# Patient Record
Sex: FEMALE | Race: WHITE | NOT HISPANIC OR LATINO | URBAN - METROPOLITAN AREA
[De-identification: names, ages, dates, MRNs, and addresses within clinical notes are randomized per-mention and may not be internally consistent; named-entity substitution may affect disease eponyms.]

---

## 2017-02-11 ENCOUNTER — INPATIENT (INPATIENT)
Facility: HOSPITAL | Age: 82
LOS: 6 days | Discharge: HOME | DRG: 260 | End: 2017-02-18
Attending: INTERNAL MEDICINE | Admitting: INTERNAL MEDICINE
Payer: MEDICARE

## 2017-02-11 VITALS
HEART RATE: 80 BPM | OXYGEN SATURATION: 99 % | TEMPERATURE: 98 F | SYSTOLIC BLOOD PRESSURE: 125 MMHG | DIASTOLIC BLOOD PRESSURE: 78 MMHG | RESPIRATION RATE: 18 BRPM

## 2017-02-11 DIAGNOSIS — Z78.9 OTHER SPECIFIED HEALTH STATUS: Chronic | ICD-10-CM

## 2017-02-11 LAB
HCT VFR BLD CALC: 25.9 % — LOW (ref 34.5–45)
HGB BLD-MCNC: 8 G/DL — LOW (ref 11.5–15.5)
MCHC RBC-ENTMCNC: 23.5 PG — LOW (ref 27–34)
MCHC RBC-ENTMCNC: 30.9 G/DL — LOW (ref 32–36)
MCV RBC AUTO: 76.2 FL — LOW (ref 80–100)
PLATELET # BLD AUTO: 211 K/UL — SIGNIFICANT CHANGE UP (ref 150–400)
RBC # BLD: 3.4 M/UL — LOW (ref 3.8–5.2)
RBC # FLD: 15 % — SIGNIFICANT CHANGE UP (ref 10.3–16.9)
WBC # BLD: 6.1 K/UL — SIGNIFICANT CHANGE UP (ref 3.8–10.5)
WBC # FLD AUTO: 6.1 K/UL — SIGNIFICANT CHANGE UP (ref 3.8–10.5)

## 2017-02-11 PROCEDURE — 99220: CPT | Mod: 25

## 2017-02-11 PROCEDURE — 93010 ELECTROCARDIOGRAM REPORT: CPT | Mod: 76

## 2017-02-11 PROCEDURE — 71010: CPT | Mod: 26

## 2017-02-11 RX ORDER — ASPIRIN/CALCIUM CARB/MAGNESIUM 324 MG
325 TABLET ORAL DAILY
Qty: 0 | Refills: 0 | Status: DISCONTINUED | OUTPATIENT
Start: 2017-02-11 | End: 2017-02-12

## 2017-02-11 RX ADMIN — Medication 325 MILLIGRAM(S): at 23:57

## 2017-02-11 NOTE — ED PROVIDER NOTE - OBJECTIVE STATEMENT
84 yo female biba for soa and palpitations while at work tonight (works as a theatre usher for musical). Pt states pmhx of "heart problems", nague historia, Denies syncope, chest pain. Pt states has had intermittent soa for past month. denies recent travel or prolonged immobilization. Pt states she has a pacemaker.

## 2017-02-11 NOTE — ED CDU PROVIDER NOTE - DETAILS
86 yo female hx ventricular pacemaker biba for near syncope while at work tonight. Will place on Observation for serial ekg's/enzymes, continuous cardiac monitoring, cardiology consult.

## 2017-02-11 NOTE — ED ADULT TRIAGE NOTE - CHIEF COMPLAINT QUOTE
palpitations for over a month worsening today. SOB with exertion. denies chest pain palpitations for over a month worsening today. SOB with exertion. denies chest pain. Pt co worker- Johnnie [phone]

## 2017-02-11 NOTE — ED CDU PROVIDER NOTE - PROGRESS NOTE DETAILS
Troponin 0.108 with normal renal fxn. periods of atrial tachycardia which correct quickly. Discussed findings with Dr. Cornejo, will give metoprolol. sleeping, vss, no complaints. awaiting 2nd troponin and ekg/cardiology.

## 2017-02-11 NOTE — ED CDU PROVIDER NOTE - OBJECTIVE STATEMENT
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 Pt states pmhx of "heart problems", nague historia, Denies syncope, chest pain. Pt states has had intermittent soa for past month. denies recent travel or prolonged immobilization. Pt states she has a pacemaker.

## 2017-02-12 DIAGNOSIS — R79.89 OTHER SPECIFIED ABNORMAL FINDINGS OF BLOOD CHEMISTRY: ICD-10-CM

## 2017-02-12 DIAGNOSIS — Z29.9 ENCOUNTER FOR PROPHYLACTIC MEASURES, UNSPECIFIED: ICD-10-CM

## 2017-02-12 DIAGNOSIS — R94.6 ABNORMAL RESULTS OF THYROID FUNCTION STUDIES: ICD-10-CM

## 2017-02-12 DIAGNOSIS — Z95.2 PRESENCE OF PROSTHETIC HEART VALVE: Chronic | ICD-10-CM

## 2017-02-12 DIAGNOSIS — D64.9 ANEMIA, UNSPECIFIED: ICD-10-CM

## 2017-02-12 DIAGNOSIS — R63.8 OTHER SYMPTOMS AND SIGNS CONCERNING FOOD AND FLUID INTAKE: ICD-10-CM

## 2017-02-12 DIAGNOSIS — R60.9 EDEMA, UNSPECIFIED: ICD-10-CM

## 2017-02-12 DIAGNOSIS — R55 SYNCOPE AND COLLAPSE: ICD-10-CM

## 2017-02-12 LAB
ALBUMIN SERPL ELPH-MCNC: 3.5 G/DL — SIGNIFICANT CHANGE UP (ref 3.4–5)
ALP SERPL-CCNC: 68 U/L — SIGNIFICANT CHANGE UP (ref 40–120)
ALT FLD-CCNC: 38 U/L — SIGNIFICANT CHANGE UP (ref 12–42)
AMPHET UR-MCNC: NEGATIVE — SIGNIFICANT CHANGE UP
ANION GAP SERPL CALC-SCNC: 7 MMOL/L — LOW (ref 9–16)
APPEARANCE UR: CLEAR — SIGNIFICANT CHANGE UP
AST SERPL-CCNC: 48 U/L — HIGH (ref 15–37)
BARBITURATES UR SCN-MCNC: NEGATIVE — SIGNIFICANT CHANGE UP
BENZODIAZ UR-MCNC: NEGATIVE — SIGNIFICANT CHANGE UP
BILIRUB SERPL-MCNC: 0.6 MG/DL — SIGNIFICANT CHANGE UP (ref 0.2–1.2)
BILIRUB UR-MCNC: NEGATIVE — SIGNIFICANT CHANGE UP
BUN SERPL-MCNC: 34 MG/DL — HIGH (ref 7–23)
CALCIUM SERPL-MCNC: 9 MG/DL — SIGNIFICANT CHANGE UP (ref 8.5–10.5)
CHLORIDE SERPL-SCNC: 103 MMOL/L — SIGNIFICANT CHANGE UP (ref 96–108)
CK MB CFR SERPL CALC: 3.8 NG/ML — HIGH (ref 0.5–3.6)
CK SERPL-CCNC: 252 U/L — HIGH (ref 26–192)
CK SERPL-CCNC: 452 U/L — HIGH (ref 26–192)
CO2 SERPL-SCNC: 29 MMOL/L — SIGNIFICANT CHANGE UP (ref 22–31)
COCAINE METAB.OTHER UR-MCNC: SIGNIFICANT CHANGE UP NG/ML
COLOR SPEC: YELLOW — SIGNIFICANT CHANGE UP
CREAT SERPL-MCNC: 1.25 MG/DL — SIGNIFICANT CHANGE UP (ref 0.5–1.3)
D DIMER BLD IA.RAPID-MCNC: 259 NG/ML DDU — HIGH
DIFF PNL FLD: NEGATIVE — SIGNIFICANT CHANGE UP
ETHANOL SERPL-MCNC: <3 MG/DL — SIGNIFICANT CHANGE UP
GLUCOSE SERPL-MCNC: 165 MG/DL — HIGH (ref 70–99)
GLUCOSE UR QL: >=1000
INR BLD: 1.23 — HIGH (ref 0.88–1.16)
KETONES UR-MCNC: NEGATIVE — SIGNIFICANT CHANGE UP
LEUKOCYTE ESTERASE UR-ACNC: NEGATIVE — SIGNIFICANT CHANGE UP
LIDOCAIN IGE QN: 287 U/L — SIGNIFICANT CHANGE UP (ref 73–393)
MAGNESIUM SERPL-MCNC: 2.3 MG/DL — SIGNIFICANT CHANGE UP (ref 1.6–2.4)
METHADONE UR-MCNC: NEGATIVE — SIGNIFICANT CHANGE UP
NITRITE UR-MCNC: NEGATIVE — SIGNIFICANT CHANGE UP
NT-PROBNP SERPL-SCNC: 8443 PG/ML — HIGH
OPIATES UR-MCNC: NEGATIVE — SIGNIFICANT CHANGE UP
PCP SPEC-MCNC: SIGNIFICANT CHANGE UP
PCP UR-MCNC: NEGATIVE — SIGNIFICANT CHANGE UP
PH UR: 5 — SIGNIFICANT CHANGE UP (ref 4–8.1)
POTASSIUM SERPL-MCNC: 3.5 MMOL/L — SIGNIFICANT CHANGE UP (ref 3.5–5.3)
POTASSIUM SERPL-SCNC: 3.5 MMOL/L — SIGNIFICANT CHANGE UP (ref 3.5–5.3)
PROT SERPL-MCNC: 6.5 G/DL — SIGNIFICANT CHANGE UP (ref 6.4–8.2)
PROT UR-MCNC: NEGATIVE MG/DL — SIGNIFICANT CHANGE UP
PROTHROM AB SERPL-ACNC: 13.6 SEC — HIGH (ref 10–13.1)
SODIUM SERPL-SCNC: 139 MMOL/L — SIGNIFICANT CHANGE UP (ref 132–145)
SP GR SPEC: 1.02 — SIGNIFICANT CHANGE UP (ref 1–1.03)
THC UR QL: NEGATIVE — SIGNIFICANT CHANGE UP
TROPONIN I SERPL-MCNC: 0.1 NG/ML — HIGH (ref 0.01–0.04)
TROPONIN I SERPL-MCNC: 0.11 NG/ML — HIGH (ref 0.02–0.06)
TROPONIN I SERPL-MCNC: 0.12 NG/ML — HIGH (ref 0.02–0.06)
TSH SERPL-MCNC: 8.44 UIU/ML — HIGH (ref 0.36–3.74)
UROBILINOGEN FLD QL: 0.2 E.U./DL — SIGNIFICANT CHANGE UP

## 2017-02-12 PROCEDURE — 93010 ELECTROCARDIOGRAM REPORT: CPT

## 2017-02-12 PROCEDURE — 99217: CPT

## 2017-02-12 PROCEDURE — 71275 CT ANGIOGRAPHY CHEST: CPT | Mod: 26

## 2017-02-12 RX ORDER — INFLUENZA VIRUS VACCINE 15; 15; 15; 15 UG/.5ML; UG/.5ML; UG/.5ML; UG/.5ML
0.5 SUSPENSION INTRAMUSCULAR ONCE
Qty: 0 | Refills: 0 | Status: DISCONTINUED | OUTPATIENT
Start: 2017-02-12 | End: 2017-02-12

## 2017-02-12 RX ORDER — METOPROLOL TARTRATE 50 MG
25 TABLET ORAL EVERY 8 HOURS
Qty: 0 | Refills: 0 | Status: DISCONTINUED | OUTPATIENT
Start: 2017-02-12 | End: 2017-02-15

## 2017-02-12 RX ORDER — FUROSEMIDE 40 MG
40 TABLET ORAL DAILY
Qty: 0 | Refills: 0 | Status: DISCONTINUED | OUTPATIENT
Start: 2017-02-12 | End: 2017-02-14

## 2017-02-12 RX ORDER — METOPROLOL TARTRATE 50 MG
5 TABLET ORAL ONCE
Qty: 0 | Refills: 0 | Status: COMPLETED | OUTPATIENT
Start: 2017-02-12 | End: 2017-02-12

## 2017-02-12 RX ORDER — ASPIRIN/CALCIUM CARB/MAGNESIUM 324 MG
325 TABLET ORAL ONCE
Qty: 0 | Refills: 0 | Status: COMPLETED | OUTPATIENT
Start: 2017-02-12 | End: 2017-02-12

## 2017-02-12 RX ORDER — ASPIRIN/CALCIUM CARB/MAGNESIUM 324 MG
1 TABLET ORAL
Qty: 0 | Refills: 0 | COMMUNITY

## 2017-02-12 RX ORDER — HEPARIN SODIUM 5000 [USP'U]/ML
5000 INJECTION INTRAVENOUS; SUBCUTANEOUS EVERY 8 HOURS
Qty: 0 | Refills: 0 | Status: DISCONTINUED | OUTPATIENT
Start: 2017-02-12 | End: 2017-02-18

## 2017-02-12 RX ORDER — FUROSEMIDE 40 MG
20 TABLET ORAL ONCE
Qty: 0 | Refills: 0 | Status: COMPLETED | OUTPATIENT
Start: 2017-02-12 | End: 2017-02-12

## 2017-02-12 RX ADMIN — Medication 25 MILLIGRAM(S): at 21:44

## 2017-02-12 RX ADMIN — HEPARIN SODIUM 5000 UNIT(S): 5000 INJECTION INTRAVENOUS; SUBCUTANEOUS at 13:24

## 2017-02-12 RX ADMIN — Medication 20 MILLIGRAM(S): at 11:09

## 2017-02-12 RX ADMIN — Medication 25 MILLIGRAM(S): at 13:23

## 2017-02-12 RX ADMIN — HEPARIN SODIUM 5000 UNIT(S): 5000 INJECTION INTRAVENOUS; SUBCUTANEOUS at 21:44

## 2017-02-12 RX ADMIN — Medication 5 MILLIGRAM(S): at 01:57

## 2017-02-12 NOTE — H&P ADULT. - NEGATIVE ENMT SYMPTOMS
no hearing difficulty/no abnormal taste sensation/no recurrent cold sores/no throat pain/no nasal discharge/no nose bleeds/no nasal obstruction/no post-nasal discharge/no sinus symptoms/no ear pain/no nasal congestion/no dry mouth/no tinnitus/no vertigo/no gum bleeding

## 2017-02-12 NOTE — H&P ADULT. - RS GEN PE MLT RESP DETAILS PC
no chest wall tenderness/no subcutaneous emphysema/no rales/airway obstructed/breath sounds equal/respirations non-labored/no wheezes/good air movement/no intercostal retractions/rhonchi/airway patent

## 2017-02-12 NOTE — H&P ADULT. - ASSESSMENT
Patient is a 85 year old F pmh pacemaker placement, AVR who presents to [facility] complaining of near syncope, palpitations found to likely be in PMT

## 2017-02-12 NOTE — H&P ADULT. - NEGATIVE MUSCULOSKELETAL SYMPTOMS
no arthralgia/no myalgia/no neck pain/no leg pain R/no leg pain L/no muscle weakness/no arm pain L/no arm pain R/no back pain/no stiffness/no muscle cramps/no arthritis/no joint swelling

## 2017-02-12 NOTE — H&P ADULT. - PROBLEM SELECTOR PLAN 1
Likely 2/2 tachycardia  -As per above discussion likely 2/2 Pacemaker mediated tachycardia  -EP consulted, will interrogate device with potential change in programming  -C/w lopressor 25 mg PO TID  -Will check Echo  -C/w telemetry monitoring

## 2017-02-12 NOTE — H&P ADULT. - RADIOLOGY RESULTS AND INTERPRETATION
Lower neck: Visualized thyroid unremarkable. No lower cervical lymphadenopathy.  Vascular: No pulmonary embolism. Main pulmonary artery is mildly enlarged, measuring 3.3 cm in diameter, which can be seen with pulmonary   hypertension. Right and left pulmonary arteries are also mildly dilated.   No aortic aneurysm or dissection. Mild aortic calcifications.    Mediastinum: Cardiomegaly. No pericardial effusion. Aortic valve replacement. Dense mitral annular calcifications. Single atrial lead and dual right ventricular leads.    Trachea/Central airways: Patent.  Lungs/Pleura: Scattered areas of subsegmental atelectasis. No consolidation or mass. 4 mm right lower lobe nodule. Smooth peripheral interlobular septal thickening bilaterally, likely represents   interstitial edema. Trace bilateral pleural effusions. Loculated pleural fluid in the right minor fissure. No pneumothorax.    Lymph nodes: No lymphadenopathy.    IMPRESSION:     No pulmonary embolism.    Mild interstitial edema and trace pleural effusions. Cardiomegaly.

## 2017-02-12 NOTE — H&P ADULT. - PROBLEM SELECTOR PLAN 6
Cardiac diet, replete electrolytes K > 4, Mg > 2  -Given weight loss patient would benefit from reviewing all preventative care cancer screening with outpatient PMD/ obtaining further collateral

## 2017-02-12 NOTE — H&P ADULT. - NEGATIVE NEUROLOGICAL SYMPTOMS
no headache/no hemiparesis/no vertigo/no loss of sensation/no confusion/no syncope/no paresthesias/no difficulty walking/no facial palsy/no generalized seizures/no focal seizures/no tremors/no transient paralysis/no weakness/no loss of consciousness

## 2017-02-12 NOTE — H&P ADULT. - PROBLEM SELECTOR PLAN 4
Microcytic anemia  -Unclear baseline although patient currently HD stable  -Will check iron studies, stool occult

## 2017-02-12 NOTE — H&P ADULT. - NEGATIVE SKIN SYMPTOMS
no pitted nails/no itching/no tumor/no hair loss/no change in size/color of mole/no rash/no brittle nails/no dryness

## 2017-02-12 NOTE — ED ADULT NURSE REASSESSMENT NOTE - NS ED NURSE REASSESS COMMENT FT1
patient verbalizing feeling okay. HR inconsistent, goes up to 130's and down to 80's. Deny CP, no dizziness.
repeat labs resulted.  Breakfast served.  vital signs stable on monitor.  No complaints offered.
Patient denies any acute chest pain, shortness of breath or palpitations, HR 110s-125s. Awaiting cardio, will continue to monitor, remains on continuous cardiac monitoring

## 2017-02-12 NOTE — H&P ADULT. - NEGATIVE GENERAL GENITOURINARY SYMPTOMS
no hematuria/no urine discoloration/no flank pain L/normal urinary frequency/no flank pain R/normal libido/no nocturia/no urinary hesitancy/no renal colic/no bladder infections/no dysuria/no gas in urine/no incontinence

## 2017-02-12 NOTE — H&P ADULT. - LYMPHATIC
anterior cervical L/supraclavicular L/axillary R/posterior cervical R/posterior cervical L/anterior cervical R/supraclavicular R/axillary L

## 2017-02-12 NOTE — H&P ADULT. - NEGATIVE GENERAL SYMPTOMS
no sweating/no polyuria/no chills/no fever/no fatigue/no polyphagia/no weight gain/no polydipsia/no malaise

## 2017-02-12 NOTE — H&P ADULT. - HISTORY OF PRESENT ILLNESS
Patient is a 85 year old F [facility] pacemaker (initially done in 1997, 2nd done [employer] implanted 5/2015 at [facility] with Dr. Laughlin) and aortic valve replacement who was BIBA to [facility] with palpitations, near syncope, SOB, and chest heaviness. 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 The manager then called EMS. Patient reports that these symptoms are not new and that she has experienced them for almost a year with some worsening over the last 2 - 3 months. She reports SOB, palpitations, as well as bilateral lower extremity edema R > L as well as previous syncopal episodes in the past. Her first was during summer 2016 with a second occurring ~ 3 weeks ago at which time she was brought by EMS to [facility]. She reports concomitant 20 - 30 pound weight loss over the last 2 - 3 months as well as anorexia. Denies fevers, chills, cough, abdominal pain, N/V/D, melena, hematochezia, urinary signs or symptoms. ROS is otherwise negative.     [facility] cardi[facility] note patient was "found to be in alternating sinus rhythm with ventricular pacing (A-sensed, V-paced), and intermittently tachycardic with what appeared to be either an atrial tachycardia with ventricular pacing vs. pacemaker mediated tachycardia (PMT).  The pt had multiple episodes of this tachycardia (HR of 125-130 bpm) in the ER lasting anywhere from 10-12 beats up to >30 seconds.  Episodes would self terminate and NSR would resume with A-sensed, V-paced rhythm in the 70-80 bpm range.  Pt felt palpitations, chest discomfort and mild SOB with tachycardic episodes.  Pt received dose of metoprolol overnight with little improvement.  A magnet was applied and revealed a PPM magnet rate of 100 bpm.  When applied during tachycardia episodes, the HR would decrease to 100 bpm, and when magnet removed, HR would generally revert to NSR with A-sensed, V-paced rhythm in the 70-80 bpm range suggesting PMT."    In the ED her vitals were as follows:  98 - 98.4, 75 - 125, 123/68 - 149/94, 17, 99% RA    She was given:  Lasix 20 mg IV x 1, Metoprolol 5 mg IV x 1,  mg PO qd x 1

## 2017-02-12 NOTE — H&P ADULT. - GASTROINTESTINAL DETAILS
no bruit/soft/bowel sounds normal/no rebound tenderness/no masses palpable/nontender/no guarding/no distention/no organomegaly/normal/no rigidity

## 2017-02-12 NOTE — H&P ADULT. - PROBLEM SELECTOR PLAN 2
Likely 2/2 demand ischemia, troponin @ .121 <- .108  -Pt currently asymptomatic, unlikely ACS  -no acute ischemic changes on EKG  -Will continue to trend to peak

## 2017-02-12 NOTE — H&P ADULT. - NEGATIVE GASTROINTESTINAL SYMPTOMS
no nausea/no abdominal pain/no vomiting/no hematochezia/no hiccoughs/no change in bowel habits/no constipation/no melena/no diarrhea/no steatorrhea/no jaundice/no flatulence

## 2017-02-12 NOTE — H&P ADULT. - NEGATIVE PSYCHIATRIC SYMPTOMS
no agitation/no suicidal ideation/no hyperactivity/no depression/no mood swings/no visual hallucinations/no anxiety/no insomnia/no paranoia/no auditory hallucinations

## 2017-02-12 NOTE — CONSULT NOTE ADULT - SUBJECTIVE AND OBJECTIVE BOX
[facility] ([facility]) ER CARDIOLOGY CONSULT NOTE    Patient is a 85y old  Female who presents with a chief complaint of palpitations    HPI:85y Female with PMHX of pacemaker and aortic valve replacement presents with palpitations, near syncope, SOB, and CP.  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  Pt has been having symptoms for months, but last saw cardiologist about "a year ago" she thinks.  Pt notes she had syncopal episode at work about 3 weeks ago and was taken by ambulance to [facility].  Notes another syncopal episode at work "this summer or fall" where she believes she also went to [place] [facility].  Last night pt was BIBEMS to [facility] ER and was found to be in alternating sinus rhythm with ventricular pacing (A-sensed, V-paced), and intermittently tachycardic with what appeared to be either an atrial tachycardia with ventricular pacing vs. pacemaker mediated tachycardia (PMT).  The pt had multiple episodes of this tachycardia (HR of 125-130 bpm) in the ER lasting anywhere from 10-12 beats up to >30 seconds.  Episodes would self terminate and NSR would resume with A-sensed, V-paced rhythm in the 70-80 bpm range.  Pt felt palpitations, chest discomfort and mild SOB with tachycardic episodes.  Pt received dose of metoprolol overnight with little improvement.  A magnet was applied and revealed a PPM magnet rate of 100 bpm.  When applied during tachycardia episodes, the HR would decrease to 100 bpm, and when magnet removed, HR would generally revert to NSR with A-sensed, V-paced rhythm in the 70-80 bpm range suggesting PMT.    Labs overnight revealed slightly uptrending troponin 0.10>0.12, elevated BNP >8000, and CT PE protocol showed interstitial edema, and small b/l pleural effusions.    REVIEW OF SYSTEMS: Denies syncope, and PND  +dizziness, +palpitations, +chest pain, +SOB, +change in exercise tolerance, +orthopnea,claudication, +edema    PAST MEDICAL & SURGICAL HISTORY:  PPM ([employer] implanted 2015 at [facility] with Dr. 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 [employer]    FAMILY HISTORY:  father  TB age 36  mother  pneumonia age 39  daughter  brain tumor    Vital Signs Last 24 Hrs  T(C): 36.9, Max: 37.2 ( @ 06:17)  T(F): 98.4, Max: 98.9 ( @ 06:17)  HR: 75 (73 - 125)  BP: 123/68 (123/68 - 149/94)  BP(mean): --  RR: 21 (16 - 22)  SpO2: 100% (99% - 100%)  PHYSICAL EXAM:  General: mild distress during tachycardia episodes  HEENT: NCAT, MMM, no icterus  Neck: +JVD to angle of jaw, no carotid bruits  Cardiovascular: S1/S2 nl, irregular, no 3/6 holosystolic murmur radiating to axilla, sternotomy scar  Lungs: decreased BS b/l  Abdominal: +BS, soft, slight diffuse TTP/ND, no RGR  Extremities: warm, dry, pulses 2+ x 4 ext,trace b/l lower ext edema R>L  Skin: no rash, bruising, or bleeding  Neuro: AAOx3, no FND    ECG: NSR atrial sensed ventricular paced, tachycardia (unknown rhythm) with ventricular pacing  TELEMETRY: reviewed  BEDSIDE ECHO FINDINGS:  EF 40-45%, mild global hypokinesis, bioprosthetic AVR, moderate/severe MR, moderate TR, moderate PI, no pericardial effusion, dilated IVC    LABS:                        8.0    6.1   )-----------( 211      ( 2017 23:45 )             25.9     2017 23:45    139    |  103    |  34     ----------------------------<  165    3.5     |  29     |  1.25     Ca    9.0        2017 23:45  Mg     2.3       2017 23:45    TPro  6.5    /  Alb  3.5    /  TBili  0.6    /  DBili  x      /  AST  48     /  ALT  38     /  AlkPhos  68     2017 23:45    CARDIAC MARKERS ( 2017 06:02 )  0.121 ng/mL / x     / x     / x     / x      CARDIAC MARKERS ( 2017 23:45 )  0.108 ng/mL / x     / 452 U/L / x     / x        PT/INR - ( 2017 23:45 )   PT: 13.6 sec;   INR: 1.23       Urinalysis Basic - ( 2017 00:32 )    Color: Yellow / Appearance: Clear / S.025 / pH: x  Gluc: x / Ketone: NEGATIVE  / Bili: NEGATIVE / Urobili: 0.2 E.U./dL   Blood: x / Protein: 30 mg/dL / Nitrite: NEGATIVE   Leuk Esterase: NEGATIVE / RBC: x / WBC x   Sq Epi: x / Non Sq Epi: x / Bacteria: x    RADIOLOGY & ADDITIONAL STUDIES: CT PE protocol: IMPRESSION: No pulmonary embolism. Mild interstitial edema and trace pleural effusions. Cardiomegaly.    PRIOR CARDIAC TESTING: none available    ASSESSMENT:  85y Female PMHX of pacemaker and aortic valve replacement presents with palpitations, near syncope, SOB, and CP.      PLAN:  1.  Tachycardia - likely PMT.  Utilize magnet temporarily to stop episodes.  EP evaluation and reprogramming of device (likely increase PVARP).  2.  Chest pain - possible demand ischemia.  Continue aspirin.  Trend troponin.  Improve heart rate as above.  3.  Abnormal troponin - see above, doubt ACS.  4.  Elevated BNP - multifactorial.  Lasix 20 mg IV x 1 given in ER.  Pt has reduced EF, and valve pathology.  Continue diuresis as tolerated.  5.  Mitral regurgitation - unknown chronicity.  Diuresis.  Optimize LV function with medication/heart rate control.  6.  Cardiomyopathy - unknown etiology/chronicity.  Obtain outpatient records.  Ischemia vs. tachycardia induced vs. valvular vs. metabolic (elevated TSH) vs. multifactorial.  Recommend formal echo, optimized HF medications, and consider ischemia eval.  7.  Microcytic anemia - check iron studies.  Obtain prior Hgb levels from outside records.  Pt described one episode of "blood running down leg" possibly from hemorrhoidal source in last few weeks.  8.  Elevated TSH - check free T3/T4.  9.  Discussed with Dr. Bialey, Dr. Mccray, and Dr. Richey.  Pt transferred to [facility] cardiac telemetry for further management and observation.

## 2017-02-13 DIAGNOSIS — I50.21 ACUTE SYSTOLIC (CONGESTIVE) HEART FAILURE: ICD-10-CM

## 2017-02-13 DIAGNOSIS — I25.10 ATHEROSCLEROTIC HEART DISEASE OF NATIVE CORONARY ARTERY WITHOUT ANGINA PECTORIS: ICD-10-CM

## 2017-02-13 LAB
ANION GAP SERPL CALC-SCNC: 6 MMOL/L — LOW (ref 9–16)
BUN SERPL-MCNC: 25 MG/DL — HIGH (ref 7–23)
CALCIUM SERPL-MCNC: 8.3 MG/DL — LOW (ref 8.5–10.5)
CHLORIDE SERPL-SCNC: 106 MMOL/L — SIGNIFICANT CHANGE UP (ref 96–108)
CHOLEST SERPL-MCNC: 104 MG/DL — SIGNIFICANT CHANGE UP
CO2 SERPL-SCNC: 28 MMOL/L — SIGNIFICANT CHANGE UP (ref 22–31)
CREAT SERPL-MCNC: 1.11 MG/DL — SIGNIFICANT CHANGE UP (ref 0.5–1.3)
GLUCOSE SERPL-MCNC: 113 MG/DL — HIGH (ref 70–99)
HBA1C BLD-MCNC: 6.9 % — HIGH (ref 4.8–5.6)
HCT VFR BLD CALC: 26.8 % — LOW (ref 34.5–45)
HDLC SERPL-MCNC: 39 MG/DL — LOW
HGB BLD-MCNC: 8 G/DL — LOW (ref 11.5–15.5)
IRON SATN MFR SERPL: 14 UG/DL — LOW (ref 50–170)
IRON SATN MFR SERPL: 4 % — LOW (ref 20–38)
LIPID PNL WITH DIRECT LDL SERPL: 51 MG/DL — SIGNIFICANT CHANGE UP
MAGNESIUM SERPL-MCNC: 2.3 MG/DL — SIGNIFICANT CHANGE UP (ref 1.6–2.4)
MCHC RBC-ENTMCNC: 22.9 PG — LOW (ref 27–34)
MCHC RBC-ENTMCNC: 29.9 G/DL — LOW (ref 32–36)
MCV RBC AUTO: 76.6 FL — LOW (ref 80–100)
OB PNL STL: NEGATIVE — SIGNIFICANT CHANGE UP
PHOSPHATE SERPL-MCNC: 3 MG/DL — SIGNIFICANT CHANGE UP (ref 2.5–4.5)
PLATELET # BLD AUTO: 231 K/UL — SIGNIFICANT CHANGE UP (ref 150–400)
POTASSIUM SERPL-MCNC: 3.7 MMOL/L — SIGNIFICANT CHANGE UP (ref 3.5–5.3)
POTASSIUM SERPL-SCNC: 3.7 MMOL/L — SIGNIFICANT CHANGE UP (ref 3.5–5.3)
RBC # BLD: 3.5 M/UL — LOW (ref 3.8–5.2)
RBC # FLD: 15.4 % — SIGNIFICANT CHANGE UP (ref 10.3–16.9)
SODIUM SERPL-SCNC: 140 MMOL/L — SIGNIFICANT CHANGE UP (ref 135–145)
T3FREE SERPL-MCNC: 1.75 PG/ML — SIGNIFICANT CHANGE UP (ref 1.71–3.71)
T4 AB SER-ACNC: 5.07 UG/DL — SIGNIFICANT CHANGE UP (ref 3.17–11.72)
TIBC SERPL-MCNC: 332 UG/DL — SIGNIFICANT CHANGE UP (ref 250–450)
TOTAL CHOLESTEROL/HDL RATIO MEASUREMENT: 2.7 RATIO — SIGNIFICANT CHANGE UP
TRIGL SERPL-MCNC: 69 MG/DL — SIGNIFICANT CHANGE UP
TSH SERPL-MCNC: 5 UIU/ML — HIGH (ref 0.35–4.94)
WBC # BLD: 7.6 K/UL — SIGNIFICANT CHANGE UP (ref 3.8–10.5)
WBC # FLD AUTO: 7.6 K/UL — SIGNIFICANT CHANGE UP (ref 3.8–10.5)

## 2017-02-13 PROCEDURE — 93970 EXTREMITY STUDY: CPT | Mod: 26

## 2017-02-13 PROCEDURE — 99223 1ST HOSP IP/OBS HIGH 75: CPT | Mod: 25

## 2017-02-13 PROCEDURE — 93010 ELECTROCARDIOGRAM REPORT: CPT

## 2017-02-13 PROCEDURE — 99223 1ST HOSP IP/OBS HIGH 75: CPT

## 2017-02-13 PROCEDURE — 93280 PM DEVICE PROGR EVAL DUAL: CPT | Mod: 26

## 2017-02-13 PROCEDURE — 93306 TTE W/DOPPLER COMPLETE: CPT | Mod: 26

## 2017-02-13 RX ORDER — ASPIRIN/CALCIUM CARB/MAGNESIUM 324 MG
81 TABLET ORAL DAILY
Qty: 0 | Refills: 0 | Status: DISCONTINUED | OUTPATIENT
Start: 2017-02-13 | End: 2017-02-18

## 2017-02-13 RX ORDER — FUROSEMIDE 40 MG
20 TABLET ORAL ONCE
Qty: 0 | Refills: 0 | Status: COMPLETED | OUTPATIENT
Start: 2017-02-13 | End: 2017-02-13

## 2017-02-13 RX ORDER — POTASSIUM CHLORIDE 20 MEQ
40 PACKET (EA) ORAL ONCE
Qty: 0 | Refills: 0 | Status: COMPLETED | OUTPATIENT
Start: 2017-02-13 | End: 2017-02-13

## 2017-02-13 RX ORDER — FERROUS SULFATE 325(65) MG
325 TABLET ORAL EVERY 12 HOURS
Qty: 0 | Refills: 0 | Status: DISCONTINUED | OUTPATIENT
Start: 2017-02-13 | End: 2017-02-18

## 2017-02-13 RX ORDER — LISINOPRIL 2.5 MG/1
2.5 TABLET ORAL DAILY
Qty: 0 | Refills: 0 | Status: DISCONTINUED | OUTPATIENT
Start: 2017-02-13 | End: 2017-02-16

## 2017-02-13 RX ADMIN — HEPARIN SODIUM 5000 UNIT(S): 5000 INJECTION INTRAVENOUS; SUBCUTANEOUS at 05:29

## 2017-02-13 RX ADMIN — LISINOPRIL 2.5 MILLIGRAM(S): 2.5 TABLET ORAL at 11:43

## 2017-02-13 RX ADMIN — HEPARIN SODIUM 5000 UNIT(S): 5000 INJECTION INTRAVENOUS; SUBCUTANEOUS at 22:19

## 2017-02-13 RX ADMIN — Medication 25 MILLIGRAM(S): at 22:19

## 2017-02-13 RX ADMIN — Medication 81 MILLIGRAM(S): at 16:21

## 2017-02-13 RX ADMIN — Medication 25 MILLIGRAM(S): at 13:18

## 2017-02-13 RX ADMIN — Medication 40 MILLIGRAM(S): at 05:29

## 2017-02-13 RX ADMIN — Medication 40 MILLIEQUIVALENT(S): at 11:09

## 2017-02-13 RX ADMIN — Medication 25 MILLIGRAM(S): at 05:29

## 2017-02-13 RX ADMIN — Medication 20 MILLIGRAM(S): at 18:00

## 2017-02-13 RX ADMIN — Medication 325 MILLIGRAM(S): at 16:21

## 2017-02-13 RX ADMIN — HEPARIN SODIUM 5000 UNIT(S): 5000 INJECTION INTRAVENOUS; SUBCUTANEOUS at 14:00

## 2017-02-13 NOTE — CONSULT NOTE ADULT - ATTENDING COMMENTS
Increase bblockers, if persistent Atach consider ablation. Also might need to upgrade to biV pacer guven cardiomyopathy. Continue diuresis.

## 2017-02-13 NOTE — PROGRESS NOTE ADULT - SUBJECTIVE AND OBJECTIVE BOX
CARDIOLOGY NP PROGRESS NOTE    Subjective: Pt seen and examined at bedside. Denies chest pain, dizziness, palpitations. reports SOB when she ambulated to the bathroom this morning. .    Overnight Events: No events.    TELEMETRY: V-paced on tele.  EKG:      VITAL SIGNS:  T(C): 37.2, Max: 37.2 (02-13 @ 14:04)  HR: 70 (70 - 70)  BP: 105/62 (102/54 - 117/57)  RR: 14 (14 - 120)  SpO2: 100% (97% - 100%)  Wt(kg): --    I&O's Summary  I & Os for 24h ending 13 Feb 2017 07:00  =============================================  IN: 700 ml / OUT: 1800 ml / NET: -1100 ml    I & Os for current day (as of 13 Feb 2017 14:54)  =============================================  IN: 0 ml / OUT: 300 ml / NET: -300 ml        PHYSICAL EXAM:    General: A/ox 3, No acute Distress  Neck: Supple, NO JVD  Cardiac: S1 S2, No M/R/G, No JVD  Pulmonary: CTAB, Breating unlabored, No Rhonchi/Rales/Wheezing  Abdomen: Soft, Non -tender, +BS x 4 quads  Extremities: No Rashes, No edema  Neuro: A/o x 3, No focal deficits    Lines:       LABS:                          8.0    7.6   )-----------( 231      ( 13 Feb 2017 07:44 )             26.8                              13 Feb 2017 07:44    140    |  106    |  25     ----------------------------<  113    3.7     |  28     |  1.11     Ca    8.3        13 Feb 2017 07:44  Phos  3.0       13 Feb 2017 07:44  Mg     2.3       13 Feb 2017 07:44    TPro  6.5    /  Alb  3.5    /  TBili  0.6    /  DBili  x      /  AST  48     /  ALT  38     /  AlkPhos  68     11 Feb 2017 23:45    LIVER FUNCTIONS - ( 11 Feb 2017 23:45 )  Alb: 3.5 g/dL / Pro: 6.5 g/dL / ALK PHOS: 68 U/L / ALT: 38 U/L / AST: 48 U/L / GGT: x                                 PT/INR - ( 11 Feb 2017 23:45 )   PT: 13.6 sec;   INR: 1.23            CAPILLARY BLOOD GLUCOSE    CARDIAC MARKERS ( 12 Feb 2017 16:11 )  0.100 ng/mL / x     / 252 U/L / x     / 3.8 ng/mL  CARDIAC MARKERS ( 12 Feb 2017 06:02 )  0.121 ng/mL / x     / x     / x     / x      CARDIAC MARKERS ( 11 Feb 2017 23:45 )  0.108 ng/mL / x     / 452 U/L / x     / x                No Known Allergies    MEDICATIONS  (STANDING):  heparin  Injectable 5000Unit(s) SubCutaneous every 8 hours  metoprolol 25milliGRAM(s) Oral every 8 hours  furosemide    Tablet 40milliGRAM(s) Oral daily  lisinopril 2.5milliGRAM(s) Oral daily    MEDICATIONS  (PRN):        DIAGNOSTIC TESTS:

## 2017-02-13 NOTE — PROGRESS NOTE ADULT - PROBLEM SELECTOR PLAN 2
s/p CABG 1997 (LIMA to LAD) and non obstructive CAD by cath in 2014.  - Elevated trops likely demand in the setting of mild cHF and tachycardia and now downtrending.  - Continue Metoprolol. Will restart Aspirin and Statin.

## 2017-02-13 NOTE — PROGRESS NOTE ADULT - PROBLEM SELECTOR PLAN 1
Likely 2/2 tachycardia  -As per above discussion likely 2/2 Pacemaker mediated tachycardia  -EP following; f/u recs.   -Continue Lopressor 25 mg every 8 hours.   -Will check Echo  -C/w telemetry monitoring Etiology likely 2/2 tachycardia. Of note, pt has a recent admission to [facility] in Dec/2016 for "dizzy spells" and was discharged on Amiodarone presumably for an atrial arrhythmia.   -  Per EP, pt likely has A-tach with PPM tracking the atrial rate.    - Continue Lopressor 25 mg every 8 hours. f/u EP recs.

## 2017-02-13 NOTE — PROGRESS NOTE ADULT - PROBLEM SELECTOR PLAN 4
Pt with chronic microcytic anemia likely 2/2 malignancy. Per PMD, baseline hgb around 8 since 2015.  - Started on Iron supplements. Stool occult negative.  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  Patient was support to return for followup which she never did.    - Will schedule patient for ongoing outpatient workup. Pt with chronic microcytic anemia likely 2/2 malignancy. Per PMD, baseline hgb around 8 since 2015.  - Started on Iron supplements. Stool occult negative.  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  Patient was supposed to return for followup which she never did.    - Will schedule patient for ongoing outpatient workup.

## 2017-02-13 NOTE — PROGRESS NOTE ADULT - ASSESSMENT
Patient is a 85 year old female with pmhx of CAD s/p CABG 1997 (LIMA to LAD), severe AS s/p TAVR (2014), PPM, Diastolic Heart Failure (EF 57% in 9/2016) who presents to [facility] complaining of near syncope, palpitations found to be likely 2/2 PMT s/p PPM interrogation and repogramming with improvement.

## 2017-02-13 NOTE — CONSULT NOTE ADULT - SUBJECTIVE AND OBJECTIVE BOX
HISTORY OF PRESENT ILLNESS:     84 y/o F with history of AVR, dual chamber PPM ([employer]) for CHB with latest generator change in  at [facility] by Dr. Laughlin, non-obstructive CAD by cath , and TAVR in , who has 3 admissions to the hospital within the last 3 months including this time for SOB / CHF exacerbation.  Old records showed LVEF of 60% in 2016.  During her last admission to [facility] in Dec 2016, she was supposed to be on Amiodarone and lasix upon discharge there.  However, her daughter and daughter-in-law  last year and she admits to not being compliant with meds and medical follow up.  She presented again on 17 with worsening SOB and LE edema.   She was noted to be in and out of NSR and atrial tachy with ventricular tracking at upper tracking rate of 130 bpm.        pmh pacemaker (initially done in , 2nd done [employer] implanted 2015 at [facility] with Dr. Laughlin) and aortic valve replacement who was BIBA to [facility] with palpitations, near syncope, SOB, and chest heaviness. 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 The manager then called EMS. Patient reports that these symptoms are not new and that she has experienced them for almost a year with some worsening over the last 2 - 3 months. She reports SOB, palpitations, as well as bilateral lower extremity edema R > L as well as previous syncopal episodes in the past. Her first was during summer 2016 with a second occurring ~ 3 weeks ago at which time she was brought by EMS to [facility]. She reports concomitant 20 - 30 pound weight loss over the last 2 - 3 months as well as anorexia. Denies fevers, chills, cough, abdominal pain, N/V/D, melena, hematochezia, urinary signs or symptoms. ROS is otherwise negative.     [facility] cardiology note patient was "found to be in alternating sinus rhythm with ventricular pacing (A-sensed, V-paced), and intermittently tachycardic with what appeared to be either an atrial tachycardia with ventricular pacing vs. pacemaker mediated tachycardia (PMT).  The pt had multiple episodes of this tachycardia (HR of 125-130 bpm) in the ER lasting anywhere from 10-12 beats up to >30 seconds.  Episodes would self terminate and NSR would resume with A-sensed, V-paced rhythm in the 70-80 bpm range.  Pt felt palpitations, chest discomfort and mild SOB with tachycardic episodes.  Pt received dose of metoprolol overnight with little improvement.  A magnet was applied and revealed a PPM magnet rate of 100 bpm.  When applied during tachycardia episodes, the HR would decrease to 100 bpm, and when magnet removed, HR would generally revert to NSR with A-sensed, V-paced rhythm in the 70-80 bpm range suggesting PMT."    In the ED her vitals were as follows:  98 - 98.4, 75 - 125, 123/68 - 149/94, 17, 99% RA    She was given:  Lasix 20 mg IV x 1, Metoprolol 5 mg IV x 1,  mg PO qd x 1 (2017 13:37)          PAST MEDICAL AND SURGICAL HISTORY:  PAST MEDICAL & SURGICAL HISTORY:  Pacemaker  Medical history unknown  Aortic valve replaced  Medical history unknown      FAMILY HISTORY: FAMILY HISTORY:  No pertinent family history in first degree relatives  Family history unknown      SOCIAL HISTORY: Occupation:  [employer]: Denied  Smoking: Denied  Drug Use: Denied  Marital Status:         REVIEW OF SYSTEM: REVIEW OF SYSTEMS:    CONSTITUTIONAL: No fever, weight loss, or fatigue  EYES: No eye pain, visual disturbances, or discharge  ENMT:  No difficulty hearing, tinnitus, vertigo; No sinus or throat pain  NECK: No pain or stiffness  BREASTS: No pain, masses, or nipple discharge  RESPIRATORY: No cough, wheezing, chills or hemoptysis; No shortness of breath  CARDIOVASCULAR: No chest pain, palpitations, dizziness, or leg swelling  GASTROINTESTINAL: No abdominal or epigastric pain. No nausea, vomiting, or hematemesis; No diarrhea or constipation. No melena or hematochezia.  GENITOURINARY: No dysuria, frequency, hematuria, or incontinence  NEUROLOGICAL: No headaches, memory loss, loss of strength, numbness, or tremors  SKIN: No itching, burning, rashes, or lesions   LYMPH NODES: No enlarged glands  ENDOCRINE: No heat or cold intolerance; No hair loss  MUSCULOSKELETAL: No joint pain or swelling; No muscle, back, or extremity pain  PSYCHIATRIC: No depression, anxiety, mood swings, or difficulty sleeping  HEME/LYMPH: No easy bruising, or bleeding gums  ALLERGY AND IMMUNOLOGIC: No hives or eczema      ALLERGY:  No Known Allergies      INPATIENT MEDICATIONS:  heparin  Injectable 5000Unit(s) SubCutaneous every 8 hours  metoprolol 25milliGRAM(s) Oral every 8 hours  furosemide    Tablet 40milliGRAM(s) Oral daily  lisinopril 2.5milliGRAM(s) Oral daily  aspirin enteric coated 81milliGRAM(s) Oral daily  ferrous    sulfate 325milliGRAM(s) Oral every 12 hours      VITAL SIGNS:   T(C): 37.2, Max: 37.2 ( @ 14:04)  HR: 70 (70 - 70)  BP: 105/62 (102/54 - 117/57)  RR: 14 (14 - 120)  SpO2: 100% (97% - 100%)  Wt(kg): --    PHYSICAL EXAM:   Appearance:   CVS:   Pulm:   Abd:  Soft, NT/ND, + BS	  Ext: No edema    LABS:                        8.0    7.6   )-----------( 231      ( 2017 07:44 )             26.8     2017 07:44    140    |  106    |  25     ----------------------------<  113    3.7     |  28     |  1.11     Ca    8.3        2017 07:44  Phos  3.0       2017 07:44  Mg     2.3       2017 07:44    TPro  6.5    /  Alb  3.5    /  TBili  0.6    /  DBili  x      /  AST  48     /  ALT  38     /  AlkPhos  68     2017 23:45    PT/INR - ( 2017 23:45 )   PT: 13.6 sec;   INR: 1.23            TSH  TroponinTroponin I, Serum: 0.100 ng/mL ( @ 16:11)  Troponin I, Serum: 0.121 ng/mL ( @ 06:02)  Troponin I, Serum: 0.108 ng/mL ( @ 23:45)     LIVER FUNCTIONS - ( 2017 23:45 )  Alb: 3.5 g/dL / Pro: 6.5 g/dL / ALK PHOS: 68 U/L / ALT: 38 U/L / AST: 48 U/L / GGT: x             EKG:    Telemetry:    ECHO: HISTORY OF PRESENT ILLNESS:     86 y/o F with history of AVR, dual chamber PPM ([employer]) for CHB with latest generator change in  at [facility] by Dr. Laughlin, non-obstructive CAD by cath , TAVR in , and a recently diagnosed pancreatic mass, who has 3 admissions to the hospital within the last 3 months including this time for SOB / CHF exacerbation.  Old records showed LVEF of 60% in 2016.  During her last admission to [facility] in Dec 2016, she was supposed to be on Amiodarone and lasix upon discharge there.  However, her daughter and daughter-in-law  last year and she admits to not being compliant with meds and medical follow up.  She presented again on 17 with worsening SOB, palpitations and LE edema.   She was noted to be in and out of NSR and atrial tachy with ventricular tracking at upper tracking rate of 130 bpm.  Device was checked yesterday and reprogrammed to VVI at 70 bpm (non-tracking mode).  She feels better today w/o palpitations / chest pressure.  She is getting diuresing.      PAST MEDICAL AND SURGICAL HISTORY: as above.     FAMILY HISTORY: FAMILY HISTORY:  No pertinent family history in first degree relatives    SOCIAL HISTORY: Occupation: [employer]: Denied  Smoking: Denied  Drug Use: Denied  Lives with her son.  [place]     REVIEW OF SYSTEM:   CONSTITUTIONAL: No fever, weight loss, or fatigue  EYES: No eye pain, visual disturbances, or discharge  ENMT:  No difficulty hearing, tinnitus, vertigo; No sinus or throat pain  NECK: No pain or stiffness  BREASTS: No pain, masses, or nipple discharge  RESPIRATORY: still shortness of breath (but better). No cough / no hemoptysis  CARDIOVASCULAR: SEE HPI  GASTROINTESTINAL: Denies n/v/d. Denies dark stool.   GENITOURINARY: No dysuria, frequency, hematuria, or incontinence  NEUROLOGICAL: No headaches, memory loss, loss of strength, numbness, or tremors  SKIN: No itching, burning, rashes, or lesions   LYMPH NODES: No enlarged glands  ENDOCRINE: No heat or cold intolerance; No hair loss  MUSCULOSKELETAL: No joint pain or swelling; No muscle, back, or extremity pain  PSYCHIATRIC: "somewhat depressed because of the deaths in the family" but denies suicidal ideation.   HEME/LYMPH: No easy bruising, or bleeding gums  ALLERGY AND IMMUNOLOGIC: No hives or eczema      ALLERGY:  No Known Allergies    INPATIENT MEDICATIONS: (started at [facility])   heparin  Injectable 5000Unit(s) SubCutaneous every 8 hours  metoprolol 25milliGRAM(s) Oral every 8 hours  furosemide    Tablet 40milliGRAM(s) Oral daily  lisinopril 2.5milliGRAM(s) Oral daily  aspirin enteric coated 81milliGRAM(s) Oral daily  ferrous    sulfate 325milliGRAM(s) Oral every 12 hours      VITAL SIGNS:   T(C): 37.2, Max: 37.2 ( @ 14:04)  HR: 70 (70 - 70)  BP: 105/62 (102/54 - 117/57)  RR: 14 (14 - 120)  SpO2: 100% (97% - 100%)    PHYSICAL EXAM:   Appearance: NAD  Neck: prominent JVD up to earlobe.   CVS: S1/S2+, regular / paced, no murmur. Old sternal scar.  Left subclavicular PPM site, no skin erosion.   Pulm: Decreased BS bilaterally. Slight rale at bases. No wheezes  Abd:  Soft, NT/ND, + BS	  Ext: +2 pitting edema LE   Neuro: AAO x 3.     LABS:                        8.0    7.6   )-----------( 231      ( 2017 07:44 )             26.8     140    |  106    |  25     ----------------------------<  113    3.7     |  28     |  1.11     Ca    8.3        2017 07:44  Phos  3.0       2017 07:44  Mg     2.3       2017 07:44    TPro  6.5    /  Alb  3.5    /  TBili  0.6    /  DBili  x      /  AST  48     /  ALT  38     /  AlkPhos  68     2017 23:45    PT/INR - ( 2017 23:45 )   PT: 13.6 sec;   INR: 1.23       TSH: 5     TroponinTroponin I, Serum: 0.100 ng/mL ( @ 16:11)  Troponin I, Serum: 0.121 ng/mL ( @ 06:02)  Troponin I, Serum: 0.108 ng/mL ( @ 23:45)     LIVER FUNCTIONS - ( 2017 23:45 )  Alb: 3.5 g/dL / Pro: 6.5 g/dL / ALK PHOS: 68 U/L / ALT: 38 U/L / AST: 48 U/L / GGT: x             EKG:      Telemetry:   at 70 bpm.     ECHO: 17:  severe concentric left ventricular hypertrophy. There is moderate   global hypokinesis of the left ventricle.  The left ventricular ejection   fraction is moderately reduced. The left ventricular ejection fraction is   estimated to be 35-40% The left atrium is severely dilated. Right   atrial size   is normal.The right ventricle is normal in size and function. There is a   pacemaker wire in the right heart.  There is a bioprosthetic aortic   valve. No   aortic regurgitation noted.The peak pressure gradient is 20 mmHg, mean   pressure gradient is 10 mmHg.Mitral annular calcification noted. There is   moderate mitral regurgitation.There is moderate tricuspid regurgitation.   There   is trace pulmonic regurgitation.  There is severe pulmonary   hypertension. The   pulmonary artery systolic pressure is estimated to be 60 mmHg.  The IVC   is   dilated (>2.1 cm) with an abnormal inspiratory collapse (<50%)   consistent with   elevated right atrial pressure.  No aortic root dilatation.There is no   pericardial effusion. HISTORY OF PRESENT ILLNESS:     86 y/o F with history of AVR, dual chamber PPM ([employer]) for CHB with latest generator change in  at [facility] by Dr. Laughlin, non-obstructive CAD by cath , TAVR in , and a recently diagnosed pancreatic mass, who has 3 admissions to the hospital within the last 3 months including this time for SOB / CHF exacerbation.  Old records showed LVEF of 60% in 2016.  During her last admission to [facility] in Dec 2016, she was supposed to be on Amiodarone and lasix upon discharge there.  However, her daughter and daughter-in-law  last year and she admits to not being compliant with meds and medical follow up.  She presented again on 17 with worsening SOB, palpitations and LE edema.   She was noted to be in and out of NSR and atrial tachy with ventricular tracking at upper tracking rate of 130 bpm.  Device was checked yesterday and reprogrammed to VVI at 70 bpm (non-tracking mode).  She feels better today w/o palpitations / chest pressure.  She is getting diuresing.      PAST MEDICAL AND SURGICAL HISTORY: as above.     FAMILY HISTORY: FAMILY HISTORY:  No pertinent family history in first degree relatives    SOCIAL HISTORY: Occupation: [employer]: Denied  Smoking: Denied  Drug Use: Denied  Lives with her son.  [place]     REVIEW OF SYSTEM:   CONSTITUTIONAL: No fever, weight loss, or fatigue  EYES: No eye pain, visual disturbances, or discharge  ENMT:  No difficulty hearing, tinnitus, vertigo; No sinus or throat pain  NECK: No pain or stiffness  BREASTS: No pain, masses, or nipple discharge  RESPIRATORY: still shortness of breath (but better). No cough / no hemoptysis  CARDIOVASCULAR: SEE HPI  GASTROINTESTINAL: Denies n/v/d. Denies dark stool.   GENITOURINARY: No dysuria, frequency, hematuria, or incontinence  NEUROLOGICAL: No headaches, memory loss, loss of strength, numbness, or tremors  SKIN: No itching, burning, rashes, or lesions   LYMPH NODES: No enlarged glands  ENDOCRINE: No heat or cold intolerance; No hair loss  MUSCULOSKELETAL: No joint pain or swelling; No muscle, back, or extremity pain  PSYCHIATRIC: "somewhat depressed because of the deaths in the family" but denies suicidal ideation.   HEME/LYMPH: No easy bruising, or bleeding gums  ALLERGY AND IMMUNOLOGIC: No hives or eczema      ALLERGY:  No Known Allergies    INPATIENT MEDICATIONS: (started at [facility])   heparin  Injectable 5000Unit(s) SubCutaneous every 8 hours  metoprolol 25milliGRAM(s) Oral every 8 hours  furosemide    Tablet 40milliGRAM(s) Oral daily  lisinopril 2.5milliGRAM(s) Oral daily  aspirin enteric coated 81milliGRAM(s) Oral daily  ferrous    sulfate 325milliGRAM(s) Oral every 12 hours      VITAL SIGNS:   T(C): 37.2, Max: 37.2 ( @ 14:04)  HR: 70 (70 - 70)  BP: 105/62 (102/54 - 117/57)  RR: 14 (14 - 120)  SpO2: 100% (97% - 100%)    PHYSICAL EXAM:   Appearance: NAD  Neck: prominent JVD up to earlobe.   CVS: S1/S2+, regular / paced, no murmur. Old sternal scar.  Left subclavicular PPM site, no skin erosion.   Pulm: Decreased BS bilaterally. Slight rale at bases. No wheezes  Abd:  Soft, NT/ND, + BS	  Ext: +2 pitting edema LE   Neuro: AAO x 3.     LABS:                        8.0    7.6   )-----------( 231      ( 2017 07:44 )             26.8     140    |  106    |  25     ----------------------------<  113    3.7     |  28     |  1.11     Ca    8.3        2017 07:44  Phos  3.0       2017 07:44  Mg     2.3       2017 07:44    TPro  6.5    /  Alb  3.5    /  TBili  0.6    /  DBili  x      /  AST  48     /  ALT  38     /  AlkPhos  68     2017 23:45    PT/INR - ( 2017 23:45 )   PT: 13.6 sec;   INR: 1.23       TSH: 5     TroponinTroponin I, Serum: 0.100 ng/mL ( @ 16:11)  Troponin I, Serum: 0.121 ng/mL ( @ 06:02)  Troponin I, Serum: 0.108 ng/mL ( @ 23:45)     LIVER FUNCTIONS - ( 2017 23:45 )  Alb: 3.5 g/dL / Pro: 6.5 g/dL / ALK PHOS: 68 U/L / ALT: 38 U/L / AST: 48 U/L / GGT: x             EK17:  at 124 bpm   EKG 17:  at 70 bpm.   12-lead rhythm strips 17: intermittent atrial tach at 450ms cycle length captured.     Telemetry:   at 70 bpm.     ECHO: 17:  severe concentric left ventricular hypertrophy. There is moderate   global hypokinesis of the left ventricle.  The left ventricular ejection   fraction is moderately reduced. The left ventricular ejection fraction is   estimated to be 35-40% The left atrium is severely dilated. Right   atrial size   is normal.The right ventricle is normal in size and function. There is a   pacemaker wire in the right heart.  There is a bioprosthetic aortic   valve. No   aortic regurgitation noted.The peak pressure gradient is 20 mmHg, mean   pressure gradient is 10 mmHg.Mitral annular calcification noted. There is   moderate mitral regurgitation.There is moderate tricuspid regurgitation.   There   is trace pulmonic regurgitation.  There is severe pulmonary   hypertension. The   pulmonary artery systolic pressure is estimated to be 60 mmHg.  The IVC   is   dilated (>2.1 cm) with an abnormal inspiratory collapse (<50%)   consistent with   elevated right atrial pressure.  No aortic root dilatation.There is no   pericardial effusion. HISTORY OF PRESENT ILLNESS:     84 y/o F with history of AVR, dual chamber PPM ([employer]) for CHB with latest generator change in  at [facility] by Dr. Laughlin, non-obstructive CAD by cath , TAVR in , and a recently diagnosed pancreatic mass,  chronic anemia (negative guiaic), who has 3 admissions to the hospital within the last 3 months including this time for SOB / CHF exacerbation.  Old records showed LVEF of 60% in 2016.  During her last admission to [facility] in Dec 2016, she was supposed to be on Amiodarone and lasix upon discharge there.  However, her daughter and daughter-in-law  last year and she admits to not being compliant with meds and medical follow up.  She presented again on 17 with worsening SOB, palpitations and LE edema.   She was noted to be in and out of NSR and atrial tachy with ventricular tracking at upper tracking rate of 130 bpm.  Device was checked yesterday and reprogrammed to VVI at 70 bpm (non-tracking mode).  She feels better today w/o palpitations / chest pressure.  She is getting diuresing.      PAST MEDICAL AND SURGICAL HISTORY: as above.     FAMILY HISTORY: FAMILY HISTORY:  No pertinent family history in first degree relatives    SOCIAL HISTORY: Occupation:   [employer]: Denied  Smoking: Denied  Drug Use: Denied  Lives with her son.  [place]     REVIEW OF SYSTEM:   CONSTITUTIONAL: No fever, weight loss, or fatigue  EYES: No eye pain, visual disturbances, or discharge  ENMT:  No difficulty hearing, tinnitus, vertigo; No sinus or throat pain  NECK: No pain or stiffness  BREASTS: No pain, masses, or nipple discharge  RESPIRATORY: still shortness of breath (but better). No cough / no hemoptysis  CARDIOVASCULAR: SEE HPI  GASTROINTESTINAL: Denies n/v/d. Denies dark stool.   GENITOURINARY: No dysuria, frequency, hematuria, or incontinence  NEUROLOGICAL: No headaches, memory loss, loss of strength, numbness, or tremors  SKIN: No itching, burning, rashes, or lesions   LYMPH NODES: No enlarged glands  ENDOCRINE: No heat or cold intolerance; No hair loss  MUSCULOSKELETAL: No joint pain or swelling; No muscle, back, or extremity pain  PSYCHIATRIC: "somewhat depressed because of the deaths in the family" but denies suicidal ideation.   HEME/LYMPH: + Anemia.   No easy bruising, or bleeding gums  ALLERGY AND IMMUNOLOGIC: No hives or eczema      ALLERGY:  No Known Allergies    INPATIENT MEDICATIONS: (started at [facility])   heparin  Injectable 5000Unit(s) SubCutaneous every 8 hours  metoprolol 25milliGRAM(s) Oral every 8 hours  furosemide    Tablet 40milliGRAM(s) Oral daily  lisinopril 2.5milliGRAM(s) Oral daily  aspirin enteric coated 81milliGRAM(s) Oral daily  ferrous    sulfate 325milliGRAM(s) Oral every 12 hours      VITAL SIGNS:   T(C): 37.2, Max: 37.2 ( @ 14:04)  HR: 70 (70 - 70)  BP: 105/62 (102/54 - 117/57)  RR: 14 (14 - 120)  SpO2: 100% (97% - 100%)    PHYSICAL EXAM:   Appearance: NAD  Neck: prominent JVD up to earlobe.   CVS: S1/S2+, regular / paced, no murmur. Old sternal scar.  Left subclavicular PPM site, no skin erosion.   Pulm: Decreased BS bilaterally. Slight rale at bases. No wheezes  Abd:  Soft, NT/ND, + BS	  Ext: +2 pitting edema LE   Neuro: AAO x 3.     LABS:                        8.0    7.6   )-----------( 231      ( 2017 07:44 )             26.8     140    |  106    |  25     ----------------------------<  113    3.7     |  28     |  1.11     Ca    8.3        2017 07:44  Phos  3.0       2017 07:44  Mg     2.3       2017 07:44    TPro  6.5    /  Alb  3.5    /  TBili  0.6    /  DBili  x      /  AST  48     /  ALT  38     /  AlkPhos  68     2017 23:45    PT/INR - ( 2017 23:45 )   PT: 13.6 sec;   INR: 1.23       TSH: 5     TroponinTroponin I, Serum: 0.100 ng/mL ( @ 16:11)  Troponin I, Serum: 0.121 ng/mL ( @ 06:02)  Troponin I, Serum: 0.108 ng/mL ( @ 23:45)     LIVER FUNCTIONS - ( 2017 23:45 )  Alb: 3.5 g/dL / Pro: 6.5 g/dL / ALK PHOS: 68 U/L / ALT: 38 U/L / AST: 48 U/L / GGT: x           ECHO: 17:  severe concentric left ventricular hypertrophy. There is moderate   global hypokinesis of the left ventricle.  The left ventricular ejection   fraction is moderately reduced. The left ventricular ejection fraction is   estimated to be 35-40% The left atrium is severely dilated. Right   atrial size   is normal.The right ventricle is normal in size and function. There is a   pacemaker wire in the right heart.  There is a bioprosthetic aortic   valve. No   aortic regurgitation noted.The peak pressure gradient is 20 mmHg, mean   pressure gradient is 10 mmHg.Mitral annular calcification noted. There is   moderate mitral regurgitation.There is moderate tricuspid regurgitation.   There   is trace pulmonic regurgitation.  There is severe pulmonary   hypertension. The   pulmonary artery systolic pressure is estimated to be 60 mmHg.  The IVC   is   dilated (>2.1 cm) with an abnormal inspiratory collapse (<50%)   consistent with   elevated right atrial pressure.  No aortic root dilatation.There is no   pericardial effusion.      EK17:  at 124 bpm   EKG 17:  at 70 bpm.     12-lead rhythm strips 17: intermittent atrial tach at 450ms cycle length captured. P wave morphology when in AT --> narrow P, Positive in leads I and aVL. Positive in V1. Negative in inferior lead (II, III, F).  Negative in precordial leads.     Telemetry:   at 70 bpm. HISTORY OF PRESENT ILLNESS:     84 y/o F with history of AVR, dual chamber PPM ([employer]) with one abandoned lead for history of CHB with latest generator change in  at [facility] by Dr. Laughlin, non-obstructive CAD by cath , TAVR in , and a recently diagnosed pancreatic mass,  chronic anemia (negative guiaic), who has 3 admissions to the hospital within the last 3 months including this time for SOB / CHF exacerbation.  Old records showed LVEF of 60% in 2016.  During her last admission to [facility] in Dec 2016, she was supposed to be on Amiodarone and lasix upon discharge there.  However, her daughter and daughter-in-law  last year and she admits to not being compliant with meds and medical follow up.  She presented again on 17 with worsening SOB, palpitations and LE edema.   She was noted to be in and out of NSR and atrial tachy with ventricular tracking at upper tracking rate of 130 bpm.  Device was checked yesterday and reprogrammed to VVI at 70 bpm (non-tracking mode).  She feels better today w/o palpitations / chest pressure.  She is getting diuresing.      PAST MEDICAL AND SURGICAL HISTORY: as above.     FAMILY HISTORY: FAMILY HISTORY:  No pertinent family history in first degree relatives    SOCIAL HISTORY: Occupation: [employer]: Denied  Smoking: Denied  Drug Use: Denied  Lives with her son.  [place]     REVIEW OF SYSTEM:   CONSTITUTIONAL: No fever, weight loss, or fatigue  EYES: No eye pain, visual disturbances, or discharge  ENMT:  No difficulty hearing, tinnitus, vertigo; No sinus or throat pain  NECK: No pain or stiffness  BREASTS: No pain, masses, or nipple discharge  RESPIRATORY: still shortness of breath (but better). No cough / no hemoptysis  CARDIOVASCULAR: SEE HPI  GASTROINTESTINAL: Denies n/v/d. Denies dark stool.   GENITOURINARY: No dysuria, frequency, hematuria, or incontinence  NEUROLOGICAL: No headaches, memory loss, loss of strength, numbness, or tremors  SKIN: No itching, burning, rashes, or lesions   LYMPH NODES: No enlarged glands  ENDOCRINE: No heat or cold intolerance; No hair loss  MUSCULOSKELETAL: No joint pain or swelling; No muscle, back, or extremity pain  PSYCHIATRIC: "somewhat depressed because of the deaths in the family" but denies suicidal ideation.   HEME/LYMPH: + Anemia.   No easy bruising, or bleeding gums  ALLERGY AND IMMUNOLOGIC: No hives or eczema      ALLERGY:  No Known Allergies    INPATIENT MEDICATIONS: (started at [facility])   heparin  Injectable 5000Unit(s) SubCutaneous every 8 hours  metoprolol 25milliGRAM(s) Oral every 8 hours  furosemide    Tablet 40milliGRAM(s) Oral daily  lisinopril 2.5milliGRAM(s) Oral daily  aspirin enteric coated 81milliGRAM(s) Oral daily  ferrous    sulfate 325milliGRAM(s) Oral every 12 hours      VITAL SIGNS:   T(C): 37.2, Max: 37.2 ( @ 14:04)  HR: 70 (70 - 70)  BP: 105/62 (102/54 - 117/57)  RR: 14 (14 - 120)  SpO2: 100% (97% - 100%)    PHYSICAL EXAM:   Appearance: NAD  Neck: prominent JVD up to earlobe.   CVS: S1/S2+, regular / paced, no murmur. Old sternal scar.  Left subclavicular PPM site, no skin erosion.   Pulm: Decreased BS bilaterally. Slight rale at bases. No wheezes  Abd:  Soft, NT/ND, + BS	  Ext: +2 pitting edema LE   Neuro: AAO x 3.     LABS:                        8.0    7.6   )-----------( 231      ( 2017 07:44 )             26.8     140    |  106    |  25     ----------------------------<  113    3.7     |  28     |  1.11     Ca    8.3        2017 07:44  Phos  3.0       2017 07:44  Mg     2.3       2017 07:44    TPro  6.5    /  Alb  3.5    /  TBili  0.6    /  DBili  x      /  AST  48     /  ALT  38     /  AlkPhos  68     2017 23:45    PT/INR - ( 2017 23:45 )   PT: 13.6 sec;   INR: 1.23       TSH: 5     TroponinTroponin I, Serum: 0.100 ng/mL ( @ 16:11)  Troponin I, Serum: 0.121 ng/mL (02-12 @ 06:02)  Troponin I, Serum: 0.108 ng/mL ( @ 23:45)     LIVER FUNCTIONS - ( 2017 23:45 )  Alb: 3.5 g/dL / Pro: 6.5 g/dL / ALK PHOS: 68 U/L / ALT: 38 U/L / AST: 48 U/L / GGT: x           ECHO: 17:  severe concentric left ventricular hypertrophy. There is moderate   global hypokinesis of the left ventricle.  The left ventricular ejection   fraction is moderately reduced. The left ventricular ejection fraction is   estimated to be 35-40% The left atrium is severely dilated. Right   atrial size   is normal.The right ventricle is normal in size and function. There is a   pacemaker wire in the right heart.  There is a bioprosthetic aortic   valve. No   aortic regurgitation noted.The peak pressure gradient is 20 mmHg, mean   pressure gradient is 10 mmHg.Mitral annular calcification noted. There is   moderate mitral regurgitation.There is moderate tricuspid regurgitation.   There   is trace pulmonic regurgitation.  There is severe pulmonary   hypertension. The   pulmonary artery systolic pressure is estimated to be 60 mmHg.  The IVC   is   dilated (>2.1 cm) with an abnormal inspiratory collapse (<50%)   consistent with   elevated right atrial pressure.  No aortic root dilatation.There is no   pericardial effusion.      EK17:  at 124 bpm   EKG 17:  at 70 bpm.     12-lead rhythm strips 17: intermittent atrial tach at 450ms cycle length captured. P wave morphology when in AT --> narrow P, Positive in leads I and aVL. Positive in V1. Negative in inferior lead (II, III, F).  Negative in precordial leads.     Telemetry:   at 70 bpm. HISTORY OF PRESENT ILLNESS:     86 y/o F with history of AVR, dual chamber PPM ([employer]) with one abandoned lead for history of CHB with latest generator change in  at [facility] by Dr. Laughlin, non-obstructive CAD by cath , TAVR in , and a recently diagnosed pancreatic mass,  chronic anemia (negative guiaic), who has 3 admissions to the hospital within the last 3 months including this time for SOB / CHF exacerbation.  Old records showed LVEF of 60% in 2016.  During her last admission to [facility] in Dec 2016, she was supposed to be on Amiodarone and lasix upon discharge there.  However, her daughter and daughter-in-law  last year and she admits to not being compliant with meds and medical follow up.  She presented again on 17 with worsening SOB, palpitations and LE edema.   She was noted to be in and out of NSR and atrial tachy with ventricular tracking at upper tracking rate of 120 bpm.  Device was checked yesterday and reprogrammed to VVI at 70 bpm (non-tracking mode).  She feels better today w/o palpitations / chest pressure.  She is getting diuresing.      PAST MEDICAL AND SURGICAL HISTORY: as above.     FAMILY HISTORY: FAMILY HISTORY:  No pertinent family history in first degree relatives    SOCIAL HISTORY: Occupation: [employer]: Denied  Smoking: Denied  Drug Use: Denied  Lives with her son.  [place]     REVIEW OF SYSTEM:   CONSTITUTIONAL: No fever, weight loss, or fatigue  EYES: No eye pain, visual disturbances, or discharge  ENMT:  No difficulty hearing, tinnitus, vertigo; No sinus or throat pain  NECK: No pain or stiffness  BREASTS: No pain, masses, or nipple discharge  RESPIRATORY: still shortness of breath (but better). No cough / no hemoptysis  CARDIOVASCULAR: SEE HPI  GASTROINTESTINAL: Denies n/v/d. Denies dark stool.   GENITOURINARY: No dysuria, frequency, hematuria, or incontinence  NEUROLOGICAL: No headaches, memory loss, loss of strength, numbness, or tremors  SKIN: No itching, burning, rashes, or lesions   LYMPH NODES: No enlarged glands  ENDOCRINE: No heat or cold intolerance; No hair loss  MUSCULOSKELETAL: No joint pain or swelling; No muscle, back, or extremity pain  PSYCHIATRIC: "somewhat depressed because of the deaths in the family" but denies suicidal ideation.   HEME/LYMPH: + Anemia.   No easy bruising, or bleeding gums  ALLERGY AND IMMUNOLOGIC: No hives or eczema      ALLERGY:  No Known Allergies    INPATIENT MEDICATIONS: (started at [facility])   heparin  Injectable 5000Unit(s) SubCutaneous every 8 hours  metoprolol 25milliGRAM(s) Oral every 8 hours  furosemide    Tablet 40milliGRAM(s) Oral daily  lisinopril 2.5milliGRAM(s) Oral daily  aspirin enteric coated 81milliGRAM(s) Oral daily  ferrous    sulfate 325milliGRAM(s) Oral every 12 hours      VITAL SIGNS:   T(C): 37.2, Max: 37.2 ( @ 14:04)  HR: 70 (70 - 70)  BP: 105/62 (102/54 - 117/57)  RR: 14 (14 - 120)  SpO2: 100% (97% - 100%)    PHYSICAL EXAM:   Appearance: NAD  Neck: prominent JVD up to earlobe.   CVS: S1/S2+, regular / paced, no murmur. Old sternal scar.  Left subclavicular PPM site, no skin erosion.   Pulm: Decreased BS bilaterally. Slight rale at bases. No wheezes  Abd:  Soft, NT/ND, + BS	  Ext: +2 pitting edema LE   Neuro: AAO x 3.     LABS:                        8.0    7.6   )-----------( 231      ( 2017 07:44 )             26.8     140    |  106    |  25     ----------------------------<  113    3.7     |  28     |  1.11     Ca    8.3        2017 07:44  Phos  3.0       2017 07:44  Mg     2.3       2017 07:44    TPro  6.5    /  Alb  3.5    /  TBili  0.6    /  DBili  x      /  AST  48     /  ALT  38     /  AlkPhos  68     2017 23:45    PT/INR - ( 2017 23:45 )   PT: 13.6 sec;   INR: 1.23       TSH: 5     TroponinTroponin I, Serum: 0.100 ng/mL ( @ 16:11)  Troponin I, Serum: 0.121 ng/mL (02-12 @ 06:02)  Troponin I, Serum: 0.108 ng/mL ( @ 23:45)     LIVER FUNCTIONS - ( 2017 23:45 )  Alb: 3.5 g/dL / Pro: 6.5 g/dL / ALK PHOS: 68 U/L / ALT: 38 U/L / AST: 48 U/L / GGT: x           ECHO: 17:  severe concentric left ventricular hypertrophy. There is moderate   global hypokinesis of the left ventricle.  The left ventricular ejection   fraction is moderately reduced. The left ventricular ejection fraction is   estimated to be 35-40% The left atrium is severely dilated. Right   atrial size   is normal.The right ventricle is normal in size and function. There is a   pacemaker wire in the right heart.  There is a bioprosthetic aortic   valve. No   aortic regurgitation noted.The peak pressure gradient is 20 mmHg, mean   pressure gradient is 10 mmHg.Mitral annular calcification noted. There is   moderate mitral regurgitation.There is moderate tricuspid regurgitation.   There   is trace pulmonic regurgitation.  There is severe pulmonary   hypertension. The   pulmonary artery systolic pressure is estimated to be 60 mmHg.  The IVC   is   dilated (>2.1 cm) with an abnormal inspiratory collapse (<50%)   consistent with   elevated right atrial pressure.  No aortic root dilatation.There is no   pericardial effusion.      EK17:  at 124 bpm   EKG 17:  at 70 bpm.     12-lead rhythm strips 17: intermittent atrial tach at 450ms cycle length captured. P wave morphology when in AT --> narrow P, Positive in leads I and aVL. Positive in V1. Negative in inferior lead (II, III, F).  Negative in precordial leads.     Telemetry:   at 70 bpm.

## 2017-02-13 NOTE — CONSULT NOTE ADULT - ASSESSMENT
84 y/o F with history of AVR, dual chamber PPM ([employer]) for CHB with latest generator change in 2015 at [facility] by Dr. Laughlin, non-obstructive CAD by cath 2014, TAVR in 2014, and a recently diagnosed pancreatic mass, chronic anemia (negative guiaic) here with CHF.   - Her last EF was 60% in Nov 2016. 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 Primary team to determine if need for ischemic evaluation.   - Per AT p-wave morphology suggests atrial tachy focus on right sided anteroseptal and low in the atrium.    - Will d/w attending regarding ablation. 86 y/o F with history of AVR, dual chamber PPM ([employer]) for CHB with latest generator change in 2015 at [facility] by Dr. Laughlin, non-obstructive CAD by cath 2014, TAVR in 2014, and a recently diagnosed pancreatic mass, chronic anemia (negative guiaic) here with CHF.   - Her last EF was 60% in Nov 2016. 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 Primary team to determine if need for ischemic evaluation.   - Per AT p-wave morphology suggests atrial tachy focus on right sided posteroseptal and low in the atrium.    - Option of ablation and upgrade PPM to BiV-PPM given chronic pacing were discussed with the patient.  - We can schedule for outpatient follow up and set up procedure in March.

## 2017-02-13 NOTE — PROGRESS NOTE ADULT - PROBLEM SELECTOR PLAN 3
Echo 2/13 shows severe LVH with moderate global hypokinesis of the LV (EF 35-40%) which has decreased from 57% noted on Echo 9/2016.  - Etiology likely tachy induced.  Ischemia unlikely in light of recent cath in 2014 showing non obstructive CAD.   - Clinically volume overloaded with +JVD, diminished breath sounds at bases, +1 JESUS.  - Continue Lasix 40 mg daily to which she's responding well (net neg 1100 ml/24 hours). Monitor strict I's and O's and daily weights  - Continue Metoprolol. Started Lisinopril 2.5 mg daily.

## 2017-02-14 ENCOUNTER — TRANSCRIPTION ENCOUNTER (OUTPATIENT)
Age: 82
End: 2017-02-14

## 2017-02-14 DIAGNOSIS — C25.9 MALIGNANT NEOPLASM OF PANCREAS, UNSPECIFIED: ICD-10-CM

## 2017-02-14 PROBLEM — Z00.00 ENCOUNTER FOR PREVENTIVE HEALTH EXAMINATION: Status: ACTIVE | Noted: 2017-02-14

## 2017-02-14 LAB
ANION GAP SERPL CALC-SCNC: 8 MMOL/L — LOW (ref 9–16)
BUN SERPL-MCNC: 27 MG/DL — HIGH (ref 7–23)
CALCIUM SERPL-MCNC: 8.4 MG/DL — LOW (ref 8.5–10.5)
CHLORIDE SERPL-SCNC: 106 MMOL/L — SIGNIFICANT CHANGE UP (ref 96–108)
CO2 SERPL-SCNC: 28 MMOL/L — SIGNIFICANT CHANGE UP (ref 22–31)
CREAT SERPL-MCNC: 1.11 MG/DL — SIGNIFICANT CHANGE UP (ref 0.5–1.3)
GLUCOSE SERPL-MCNC: 98 MG/DL — SIGNIFICANT CHANGE UP (ref 70–99)
HCT VFR BLD CALC: 24.9 % — LOW (ref 34.5–45)
HCT VFR BLD CALC: 26.7 % — LOW (ref 34.5–45)
HGB BLD-MCNC: 7.4 G/DL — LOW (ref 11.5–15.5)
HGB BLD-MCNC: 7.9 G/DL — LOW (ref 11.5–15.5)
MAGNESIUM SERPL-MCNC: 2.1 MG/DL — SIGNIFICANT CHANGE UP (ref 1.6–2.4)
MCHC RBC-ENTMCNC: 22.8 PG — LOW (ref 27–34)
MCHC RBC-ENTMCNC: 22.8 PG — LOW (ref 27–34)
MCHC RBC-ENTMCNC: 29.6 G/DL — LOW (ref 32–36)
MCHC RBC-ENTMCNC: 29.7 G/DL — LOW (ref 32–36)
MCV RBC AUTO: 76.9 FL — LOW (ref 80–100)
MCV RBC AUTO: 77.2 FL — LOW (ref 80–100)
PLATELET # BLD AUTO: 192 K/UL — SIGNIFICANT CHANGE UP (ref 150–400)
PLATELET # BLD AUTO: 222 K/UL — SIGNIFICANT CHANGE UP (ref 150–400)
POTASSIUM SERPL-MCNC: 4 MMOL/L — SIGNIFICANT CHANGE UP (ref 3.5–5.3)
POTASSIUM SERPL-SCNC: 4 MMOL/L — SIGNIFICANT CHANGE UP (ref 3.5–5.3)
RBC # BLD: 3.24 M/UL — LOW (ref 3.8–5.2)
RBC # BLD: 3.46 M/UL — LOW (ref 3.8–5.2)
RBC # FLD: 15.4 % — SIGNIFICANT CHANGE UP (ref 10.3–16.9)
RBC # FLD: 15.5 % — SIGNIFICANT CHANGE UP (ref 10.3–16.9)
SODIUM SERPL-SCNC: 142 MMOL/L — SIGNIFICANT CHANGE UP (ref 135–145)
WBC # BLD: 5.7 K/UL — SIGNIFICANT CHANGE UP (ref 3.8–10.5)
WBC # BLD: 6.5 K/UL — SIGNIFICANT CHANGE UP (ref 3.8–10.5)
WBC # FLD AUTO: 5.7 K/UL — SIGNIFICANT CHANGE UP (ref 3.8–10.5)
WBC # FLD AUTO: 6.5 K/UL — SIGNIFICANT CHANGE UP (ref 3.8–10.5)

## 2017-02-14 PROCEDURE — 99233 SBSQ HOSP IP/OBS HIGH 50: CPT

## 2017-02-14 PROCEDURE — 99232 SBSQ HOSP IP/OBS MODERATE 35: CPT

## 2017-02-14 RX ORDER — FUROSEMIDE 40 MG
20 TABLET ORAL ONCE
Qty: 0 | Refills: 0 | Status: COMPLETED | OUTPATIENT
Start: 2017-02-14 | End: 2017-02-14

## 2017-02-14 RX ORDER — FUROSEMIDE 40 MG
40 TABLET ORAL DAILY
Qty: 0 | Refills: 0 | Status: DISCONTINUED | OUTPATIENT
Start: 2017-02-14 | End: 2017-02-15

## 2017-02-14 RX ADMIN — Medication 25 MILLIGRAM(S): at 05:44

## 2017-02-14 RX ADMIN — Medication 25 MILLIGRAM(S): at 21:05

## 2017-02-14 RX ADMIN — HEPARIN SODIUM 5000 UNIT(S): 5000 INJECTION INTRAVENOUS; SUBCUTANEOUS at 05:44

## 2017-02-14 RX ADMIN — Medication 20 MILLIGRAM(S): at 13:15

## 2017-02-14 RX ADMIN — Medication 81 MILLIGRAM(S): at 11:58

## 2017-02-14 RX ADMIN — LISINOPRIL 2.5 MILLIGRAM(S): 2.5 TABLET ORAL at 05:44

## 2017-02-14 RX ADMIN — Medication 40 MILLIGRAM(S): at 05:44

## 2017-02-14 RX ADMIN — Medication 25 MILLIGRAM(S): at 14:05

## 2017-02-14 RX ADMIN — HEPARIN SODIUM 5000 UNIT(S): 5000 INJECTION INTRAVENOUS; SUBCUTANEOUS at 14:05

## 2017-02-14 RX ADMIN — Medication 325 MILLIGRAM(S): at 17:50

## 2017-02-14 RX ADMIN — Medication 325 MILLIGRAM(S): at 05:44

## 2017-02-14 RX ADMIN — HEPARIN SODIUM 5000 UNIT(S): 5000 INJECTION INTRAVENOUS; SUBCUTANEOUS at 21:05

## 2017-02-14 NOTE — PROGRESS NOTE ADULT - SUBJECTIVE AND OBJECTIVE BOX
EPS Progress Note    S: Getting blood transfusion now.  Otherwise no SOB.     O: T(C): 37.1, Max: 37.3 (02-13 @ 18:19)  HR: 70 (70 - 70)  BP: 114/84 (95/54 - 134/74)  RR: 16 (16 - 18)  SpO2: 98% (98% - 100%)    TELE:   at 70 bpm     PHYSICAL  General:  NAD        Chest:  still decreased BS bilaterally and mild rales at bases. No wheeze   Cardiac:  RRR, + s1/s2   Abdomen:  +BS , soft ND/NT  Extremities: better LE edema    LABS:                        7.9    6.5   )-----------( 222      ( 14 Feb 2017 10:50 )             26.7     142    |  106    |  27     ----------------------------<  98     4.0     |  28     |  1.11     Ca    8.4        14 Feb 2017 07:28  Phos  3.0       13 Feb 2017 07:44  Mg     2.1       14 Feb 2017 07:28      MEDICATIONS:  heparin  Injectable 5000Unit(s) SubCutaneous every 8 hours  metoprolol 25milliGRAM(s) Oral every 8 hours  lisinopril 2.5milliGRAM(s) Oral daily  aspirin enteric coated 81milliGRAM(s) Oral daily  ferrous    sulfate 325milliGRAM(s) Oral every 12 hours  furosemide    Tablet 40milliGRAM(s) Oral daily

## 2017-02-14 NOTE — PHYSICAL THERAPY INITIAL EVALUATION ADULT - ADDITIONAL COMMENTS
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 4 th floor walk up, occasionally uses a cane. no falls recently

## 2017-02-14 NOTE — PROGRESS NOTE ADULT - PROBLEM SELECTOR PLAN 1
Etiology likely 2/2 paroxysmal atrial tachycardia. Of note, pt has a recent admission to [facility] in Dec/2016 for "dizzy spells" and was discharged on Amiodarone presumably for an atrial arrhythmia.   -  Per EP, pt has A-tach with PPM tracking the atrial rate.  They recommend Atach ablation and BIV upgrade as patient has underlying CHB and will be PPM dependant. However, it will likely have be done in the outpatient setting.    - Continue med mgmt with  Lopressor 25 mg every 8 hours.

## 2017-02-14 NOTE — DISCHARGE NOTE ADULT - HOSPITAL COURSE
Patient is a 85 year old F [facility] pacemaker (initially done in 1997, 2nd done [employer] implanted 5/2015 at [facility] with Dr. Laughlin) and aortic valve replacement who was BIBA to [facility] with palpitations, near syncope, SOB, and chest heaviness. 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 The manager then called EMS. Patient reports that these symptoms are not new and that she has experienced them for almost a year with some worsening over the last 2 - 3 months. She reports SOB, palpitations, as well as bilateral lower extremity edema R > L as well as previous syncopal episodes in the past. Her first was during summer 2016 with a second occurring ~ 3 weeks ago at which time [facility]. She reports concomitant 20 - 30 pound weight loss over the last 2 - 3 months as well as anorexia. Denies fevers, chills, cough, abdominal pain, N/V/D, melena, hematochezia, urinary signs or symptoms. ROS is otherwise negative.     [facility] cardiology note patient was "found to be in alternating sinus rhythm with ventricular pacing (A-sensed, V-paced), and intermittently tachycardic with what appeared to be either an atrial tachycardia with ventricular pacing vs. pacemaker mediated tachycardia (PMT).  The pt had multiple episodes of this tachycardia (HR of 125-130 bpm) in the ER lasting anywhere from 10-12 beats up to >30 seconds.  Episodes would self terminate and NSR would resume with A-sensed, V-paced rhythm in the 70-80 bpm range.  Pt felt palpitations, chest discomfort and mild SOB with tachycardic episodes.  Pt received dose of metoprolol overnight with little improvement.  A magnet was applied and revealed a PPM magnet rate of 100 bpm.  When applied during tachycardia episodes, the HR would decrease to 100 bpm, and when magnet removed, HR would generally revert to NSR with A-sensed, V-paced rhythm in the 70-80 bpm range suggesting PMT."    In the ED her vitals were as follows:  98 - 98.4, 75 - 125, 123/68 - 149/94, 17, 99% RA    She was given:  Lasix 20 mg IV x 1, Metoprolol 5 mg IV x 1,  mg PO qd x 1 Patient is a 85 year old Female with pmh CAD s/p CABG 1997 (LIMA to LAD), severe AS s/p TAVR (2014), PPM for CHB, Diastolic Heart Failure (EF 57% in 9/2016), Pancreatic CA who presented to [facility] with near syncope and palpitations.  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 The manager then called EMS. Patient reports that these symptoms are not new and that she has experienced them for almost a year with some worsening over the last 2 - 3 months. She reports SOB, palpitations, as well as bilateral lower extremity edema R > L as well as previous syncopal episodes in the past. Her first was during summer 2016 with a second occurring ~ 3 weeks ago at which time she was brought by EMS [facility]. She reports concomitant 20 - 30 pound weight loss over the last 2 - 3 months as well as anorexia. Denies fevers, chills, cough, abdominal pain, N/V/D, melena, hematochezia, urinary signs or symptoms. ROS is otherwise negative. \    Patient  was evaluated by EP and  found to be in A-tach with PMT as the PPM was tracking her atrial rate. She was also found to be in  acute decompensated CHF with EF down to 35-40% compared to 57% in 9/2016 which was thought to be tachy induced. She was diuresed with IV Lasix, started on ACE/BB. PPM setting were changed to VVI and patient was started on Metoprolol with some improvement in her HR.  Ep recommended out pt Atach ablation 2/2 patient's history of noncompliance with medications.   Her troponins were mildly elevated which was likely demand in the setting of tachycardia.  In light of recent cath in 2014 showing non obstructive CAD, no further ischemic evaluation was pursued.  She underwent a BIV upgrade of her PPM as  her underlying rhythm was CHB and pt would be PPM dependant. Patient is a 85 year old Female with pmh CAD s/p CABG 1997 (LIMA to LAD), severe AS s/p TAVR (2014), PPM for CHB, Diastolic Heart Failure (EF 57% in 9/2016), Pancreatic CA (papillary mucinous Neoplasm) who presented to [facility] with near syncope and palpitations.  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 The manager then called EMS. Patient reports that these symptoms are not new and that she has experienced them for almost a year with some worsening over the last 2 - 3 months. She reports SOB, palpitations, as well as bilateral lower extremity edema R > L as well as previous syncopal episodes in the past. Her first was during summer 2016 with a second occurring ~ 3 weeks ago at which time she was brought by EMS to [facility]. She reports concomitant 20 - 30 pound weight loss over the last 2 - 3 months as well as anorexia. Denies fevers, chills, cough, abdominal pain, N/V/D, melena, hematochezia, urinary signs or symptoms. ROS is otherwise negative. \    Patient  was evaluated by EP and  found to be in A-tach with PMT as the PPM was tracking her atrial rate. She was also found to be in  acute decompensated CHF with EF down to 35-40% compared to 57% in 9/2016 which was thought to be tachy induced. She was diuresed with IV Lasix, started on ACE/BB. PPM setting were changed to VVI and patient was started on Metoprolol with some improvement in her HR.  Ep recommended out pt Atach ablation 2/2 patient's history of noncompliance with medications.   Her troponins were mildly elevated which was likely demand in the setting of tachycardia.  In light of recent cath in 2014 showing non obstructive CAD, no further ischemic evaluation was pursued.  She underwent a BIV upgrade of her PPM as  her underlying rhythm was CHB and pt would be PPM dependant. Patient is a 85 year old Female with pmh CAD s/p CABG 1997 (LIMA to LAD), severe AS s/p TAVR (2014), PPM for CHB, Diastolic Heart Failure (EF 57% in 9/2016), Pancreatic CA (papillary mucinous Neoplasm) who presented to [facility] with near syncope and palpitations.  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 The manager then called EMS. Patient reports that these symptoms are not new and that she has experienced them for almost a year with some worsening over the last 2 - 3 months. She reports SOB, palpitations, as well as bilateral lower extremity edema R > L as well as previous syncopal episodes in the past. Her first was during summer 2016 with a second occurring ~ 3 weeks ago at which time she was brought by EMS to [facility]. She reports concomitant 20 - 30 pound weight loss over the last 2 - 3 months as well as anorexia. Denies fevers, chills, cough, abdominal pain, N/V/D, melena, hematochezia, urinary signs or symptoms. ROS is otherwise negative. \    Patient  was evaluated by EP and  found to be in A-tach with PMT as the PPM was tracking her atrial rate. She was also found to be in  acute decompensated CHF with EF down to 35-40% compared to 57% in 9/2016 which was thought to be tachy induced. She was diuresed with IV Lasix, started on ACE/BB. PPM setting were changed to VVI and patient was started on Metoprolol with some improvement in her HR.  Ep recommended out pt Atach ablation 2/2 patient's history of noncompliance with medications.   Her troponins were mildly elevated which was likely demand in the setting of tachycardia.  In light of recent cath in 2014 showing non obstructive CAD, no further ischemic evaluation was pursued.  She underwent a BIV upgrade of her PPM as  her underlying rhythm was CHB and pt would be PPM dependant. Discharged with follow up appointments arranged on 2/18.

## 2017-02-14 NOTE — PROGRESS NOTE ADULT - ASSESSMENT
86 y/o F with history of AVR, dual chamber PPM ([employer]) for CHB with latest generator change in 2015 at [facility] by Dr. Laughlin, non-obstructive CAD by cath 2014, TAVR in 2014, and a recently diagnosed pancreatic mass, chronic anemia (negative guiaic) here with CHF EF 35-40%. Hospital course with AT noted on admission with tracking at 120 bpm. Device reprogrammed to VVI at 70 bpm.   - She is currently in sinus with PACs.   - Due to schedule, may not be able to pursue ablation this admission.   She can f/u with Dr. Bailey on 2/22/17 and to schedule for outpatient ablation on March 3rd.  Will give final recs regarding timing of procedure tomorrow.   - Continue Metoprolol 25 mg TID.   - She will be potentially BiV-PPM upgrade in the future as well becasue of chronic  due to CHB.   - D/W DR. Bailey and 5 [facility] team

## 2017-02-14 NOTE — PROGRESS NOTE ADULT - PROBLEM SELECTOR PLAN 2
s/p CABG 1997 (LIMA to LAD) and non obstructive CAD by cath in 2014.  - Elevated trops likely demand in the setting of mild cHF and tachycardia and now downtrending.  - Continue Metoprolol and Aspirin. LDL 51. NO statin for now.

## 2017-02-14 NOTE — PROGRESS NOTE ADULT - PROBLEM SELECTOR PLAN 5
Per PMD patient has pancreatic CA followed by GI.  She underwent FNA biopsy of pancreatic mass but failed to follow up thereafter.   - Will reinforce compliance with patient.

## 2017-02-14 NOTE — PROGRESS NOTE ADULT - ASSESSMENT
Patient is a 85 year old female with pmhx of CAD s/p CABG 1997 (LIMA to LAD), severe AS s/p TAVR (2014), PPM, Diastolic Heart Failure (EF 57% in 9/2016), Pancreatic CA who presents to [facility] with near syncope, palpitations found to be in Atach and decompensated CHF being evaluated by EP for possible Atach ablation and BIV upgrade.

## 2017-02-14 NOTE — PROGRESS NOTE ADULT - PROBLEM SELECTOR PLAN 3
Echo 2/13 shows severe LVH with moderate global hypokinesis of the LV (EF 35-40%) which has decreased from 57% noted on Echo 9/2016.  - Etiology likely tachy induced.  Ischemia unlikely in light of recent cath in 2014 showing non obstructive CAD.   - Clinically volume overloaded with +JVD, diminished breath sounds at bases, +1 JESUS, but improving clinically.  - Continue Lasix 40 mg daily.  Will give an additional 20 mg IV today with 1 unit of PRBC. Monitor strict I's and O's and daily weights  - Continue Metoprolol and Lisinopril.

## 2017-02-14 NOTE — DISCHARGE NOTE ADULT - PATIENT PORTAL LINK FT
“You can access the Follow[facility] Patient Portal, offered by [facility], by registering with the following website: [email or web address]”

## 2017-02-14 NOTE — DISCHARGE NOTE ADULT - PLAN OF CARE
You were found to have an abnormal heart rhythm called atrial tachycardia which is the cause of your symptoms that brought you to the hospital. You were seen by an Electrophysiologist and Metoprolol was started to control the heart rate. They recommend you have a procedure called atrial The echocardiogram of your heart shows the pumping function of your heart is weak (35-40%).  This is likely due to the fast heart rate. Because of this, you had fluid in your lungs and body and you were treated with intravenous Lasix to help you breathe better. You were started on several medications to treat heart failure (Lasix, Lisinopril, Metprolol); please take it as prescribed. Please follow up with your Cardiologist as he may want to do a stress test to check for any blockages in arteries that supply blood to the heart. You have a diagnosis of pancreatic cancer.  Please follow up with your primary care Doctor and Gastroenterologist to discuss further treatment. Prevent further presyncopal events. You were found to have an abnormal heart rhythm called atrial tachycardia which is the cause of your symptoms that brought you to the hospital. Atrial tachycardia is a heart rate that is faster than normal and can cause symptoms such as palpitations, dizziness and even make you pass out. You were seen by an Electrophysiologist and Metoprolol was started to control the heart rate. Please follow up with the Electrophysiologist on the scheduled date below to discuss the possibility of an ablation which is a procedure that uses heat, cold, or radio energy to scar some tissue inside your heart where the abnormal heart rhythm comes from. You have a diagnosis of anemia which is a condition where is low red blood cells in the body.  You were given 1 unit of blood during your hospitalization. Please follow up with your primary Doctor for ongoing monitoring. The echocardiogram of your heart shows the pumping function of your heart is weak (35-40%).  This is likely due to the fast heart rate. Because of this, you had fluid in your lungs and body and you were treated with intravenous Lasix to help you breathe better. You were started on several medications to treat heart failure (Lasix, Lisinopril, Metprolol); please take it as prescribed. Please follow up with your Cardiologist as he may want to do a stress test to check for any blockages in arteries that supply blood to the heart.  Because of your weakened heart, your pacemaker was upgraded to help your heart pump better. Please DO NOT lift your left arm above shoulder level or lift anything more than 10 pounds using the left arm for at least one month. If you notice any swelling or discharge from the surgical site or experience fever or chills, please notify your Doctor right away. You have a diagnosis of pancreatic cancer.  Please follow up with your primary care Doctor and Gastroenterologist listed below to discuss further treatment. You were found to have an abnormal heart rhythm called atrial tachycardia which is the cause of your symptoms that brought you to the hospital. Atrial tachycardia is a heart rate that is faster than normal and can cause symptoms such as palpitations, dizziness and even make you pass out. You were seen by an Electrophysiologist and Metoprolol was started to control the heart rate.  Please follow up with the Electrophysiologist on the scheduled date below to discuss the possibility of an ablation which is a procedure that uses heat, cold, or radio energy to scar some tissue inside your heart where the abnormal heart rhythm comes from.

## 2017-02-14 NOTE — DISCHARGE NOTE ADULT - ADDITIONAL INSTRUCTIONS
1.  Please follow up with Dr. Chung on March 1st at 11:30 am (Electrophysiologist)  [address] [place], [place]   [phone]    2.  Please follow up with Dr. Jovi Cooper on March 6th at 1:30 pm (Primary Doctor)  [address] [place] [address]  Dr. Cooper: [phone]    3. Please follow up with Dr. Napoleon Hutson on March 2nd at 4 pm. (Cardiologist)  [address] [place] [address] [address], [place] [address]  Dr. Hutson: [phone]    4. Please follow up with Dr. Bal Driver within two weeks of discharge (Gastroenterologist)  [facility], [place] [address]  Dr. Bal Driver: [phone]/ [phone] 1.  Please follow up with Dr. Chung on March 16th at 1:30 PM (Electrophysiologist)  [address] [place] [address], [address] [place], [place]   [phone]    2.  Please follow up with Dr. Jovi Cooper on March 6th at 1:30 pm (Primary Doctor)  [address] [place] [address]  Dr. Cooper: [phone]    3. Please follow up with Dr. Napoleon Hutson on March 2nd at 4 pm. (Cardiologist)  [address] [place] [address] [address], [place] [address]  Dr. Hutson: [phone]    4. Please follow up with Dr. Bal Driver within two weeks of discharge (Gastroenterologist)  [address], [place] [address]  Dr. Bal Driver: [phone]/ [phone]

## 2017-02-14 NOTE — PHYSICAL THERAPY INITIAL EVALUATION ADULT - PHYSICAL ASSIST/NONPHYSICAL ASSIST: STAIR NEGOTIATION, REHAB EVAL
Subjective:      History was provided by the mother and patient was brought in for Fever; Cough; and Headache  .    History of Present Illness:  HPI fever for 2 days, coughing, asthma is acting up,on albuterol, tylenol and OTC cough medicine.  Feeling better today.  Has mild asthma, uses albuterol about once a month.    Review of Systems   Constitutional: Positive for fever. Negative for activity change and appetite change.   HENT: Positive for congestion and sore throat. Negative for ear pain, mouth sores and rhinorrhea.    Eyes: Negative for redness.   Respiratory: Positive for cough.    Cardiovascular: Negative for chest pain.   Gastrointestinal: Negative for abdominal distention, diarrhea and vomiting.   Genitourinary: Negative for dysuria.   Musculoskeletal: Positive for myalgias.   Skin: Negative for rash.       Objective:     Physical Exam   Constitutional: She appears well-nourished. She is active.   HENT:   Right Ear: Tympanic membrane normal.   Left Ear: Tympanic membrane normal.   Nose: Nasal discharge present.   Mouth/Throat: Mucous membranes are moist.   Eyes: Conjunctivae are normal.   Neck: Neck supple.   Cardiovascular: Regular rhythm.    No murmur heard.  Pulmonary/Chest: Effort normal and breath sounds normal. She has no wheezes. She has no rhonchi.   Abdominal: Soft. There is no tenderness.   Neurological: She is alert.   Skin: Skin is warm. No rash noted.       Assessment:        1. Influenza    2. Mild intermittent asthma without complication         Plan:        Ange Ventura was seen today for fever, cough and headache.    Diagnoses and all orders for this visit:    Influenza    Mild intermittent asthma without complication      Patient Instructions   Push fluids(water, juices, soup,..,) Can take over the counter cold medication as needed,can take ibuoprofen or acetaminophen (such as Tylenol ) every 4-6 hours as needed for fever, discussed worsening s/s and contagiousness, may return to school  once fever free for 24 hours.   Continue Albuterol as needed.     1 person assist

## 2017-02-14 NOTE — DISCHARGE NOTE ADULT - CARE PLAN
Principal Discharge DX:	Atrial tachycardia  Instructions for follow-up, activity and diet:	You were found to have an abnormal heart rhythm called atrial tachycardia which is the cause of your symptoms that brought you to the hospital. You were seen by an Electrophysiologist and Metoprolol was started to control the heart rate. They recommend you have a procedure called atrial  Secondary Diagnosis:	Acute systolic congestive heart failure  Secondary Diagnosis:	Pancreatic cancer Principal Discharge DX:	Atrial tachycardia  Goal:	Prevent further presyncopal events.  Instructions for follow-up, activity and diet:	You were found to have an abnormal heart rhythm called atrial tachycardia which is the cause of your symptoms that brought you to the hospital. Atrial tachycardia is a heart rate that is faster than normal and can cause symptoms such as palpitations, dizziness and even make you pass out. You were seen by an Electrophysiologist and Metoprolol was started to control the heart rate. Please follow up with the Electrophysiologist on the scheduled date below to discuss the possibility of an ablation which is a procedure that uses heat, cold, or radio energy to scar some tissue inside your heart where the abnormal heart rhythm comes from.  Secondary Diagnosis:	Acute systolic congestive heart failure  Instructions for follow-up, activity and diet:	The echocardiogram of your heart shows the pumping function of your heart is weak (35-40%).  This is likely due to the fast heart rate. Because of this, you had fluid in your lungs and body and you were treated with intravenous Lasix to help you breathe better. You were started on several medications to treat heart failure (Lasix, Lisinopril, Metprolol); please take it as prescribed. Please follow up with your Cardiologist as he may want to do a stress test to check for any blockages in arteries that supply blood to the heart.  Secondary Diagnosis:	Pancreatic cancer  Instructions for follow-up, activity and diet:	You have a diagnosis of pancreatic cancer.  Please follow up with your primary care Doctor and Gastroenterologist to discuss further treatment. Principal Discharge DX:	Atrial tachycardia  Goal:	Prevent further presyncopal events.  Instructions for follow-up, activity and diet:	You were found to have an abnormal heart rhythm called atrial tachycardia which is the cause of your symptoms that brought you to the hospital. Atrial tachycardia is a heart rate that is faster than normal and can cause symptoms such as palpitations, dizziness and even make you pass out. You were seen by an Electrophysiologist and Metoprolol was started to control the heart rate. Please follow up with the Electrophysiologist on the scheduled date below to discuss the possibility of an ablation which is a procedure that uses heat, cold, or radio energy to scar some tissue inside your heart where the abnormal heart rhythm comes from.  Secondary Diagnosis:	Acute systolic congestive heart failure  Instructions for follow-up, activity and diet:	The echocardiogram of your heart shows the pumping function of your heart is weak (35-40%).  This is likely due to the fast heart rate. Because of this, you had fluid in your lungs and body and you were treated with intravenous Lasix to help you breathe better. You were started on several medications to treat heart failure (Lasix, Lisinopril, Metprolol); please take it as prescribed. Please follow up with your Cardiologist as he may want to do a stress test to check for any blockages in arteries that supply blood to the heart.  Because of your weakened heart, your pacemaker was upgraded to help your heart pump better. Please DO NOT lift your left arm above shoulder level or lift anything more than 10 pounds using the left arm for at least one month. If you notice any swelling or discharge from the surgical site or experience fever or chills, please notify your Doctor right away.  Secondary Diagnosis:	Pancreatic cancer  Instructions for follow-up, activity and diet:	You have a diagnosis of pancreatic cancer.  Please follow up with your primary care Doctor and Gastroenterologist listed below to discuss further treatment.  Secondary Diagnosis:	Anemia  Instructions for follow-up, activity and diet:	You have a diagnosis of anemia which is a condition where is low red blood cells in the body.  You were given 1 unit of blood during your hospitalization. Please follow up with your primary Doctor for ongoing monitoring. Principal Discharge DX:	Atrial tachycardia  Goal:	Prevent further presyncopal events.  Instructions for follow-up, activity and diet:	You were found to have an abnormal heart rhythm called atrial tachycardia which is the cause of your symptoms that brought you to the hospital. Atrial tachycardia is a heart rate that is faster than normal and can cause symptoms such as palpitations, dizziness and even make you pass out. You were seen by an Electrophysiologist and Metoprolol was started to control the heart rate.  Please follow up with the Electrophysiologist on the scheduled date below to discuss the possibility of an ablation which is a procedure that uses heat, cold, or radio energy to scar some tissue inside your heart where the abnormal heart rhythm comes from.  Secondary Diagnosis:	Acute systolic congestive heart failure  Instructions for follow-up, activity and diet:	The echocardiogram of your heart shows the pumping function of your heart is weak (35-40%).  This is likely due to the fast heart rate. Because of this, you had fluid in your lungs and body and you were treated with intravenous Lasix to help you breathe better. You were started on several medications to treat heart failure (Lasix, Lisinopril, Metprolol); please take it as prescribed. Please follow up with your Cardiologist as he may want to do a stress test to check for any blockages in arteries that supply blood to the heart.  Because of your weakened heart, your pacemaker was upgraded to help your heart pump better. Please DO NOT lift your left arm above shoulder level or lift anything more than 10 pounds using the left arm for at least one month. If you notice any swelling or discharge from the surgical site or experience fever or chills, please notify your Doctor right away.  Secondary Diagnosis:	Pancreatic cancer  Instructions for follow-up, activity and diet:	You have a diagnosis of pancreatic cancer.  Please follow up with your primary care Doctor and Gastroenterologist listed below to discuss further treatment.  Secondary Diagnosis:	Anemia  Instructions for follow-up, activity and diet:	You have a diagnosis of anemia which is a condition where is low red blood cells in the body.  You were given 1 unit of blood during your hospitalization. Please follow up with your primary Doctor for ongoing monitoring.

## 2017-02-14 NOTE — PHYSICAL THERAPY INITIAL EVALUATION ADULT - PERTINENT HX OF CURRENT PROBLEM, REHAB EVAL
Patient is a 85 year old F pmh pacemaker placement, AVR who presents to [facility] complaining of near syncope, palpitations found to likely be in PMT

## 2017-02-14 NOTE — DISCHARGE NOTE ADULT - CARE PROVIDERS DIRECT ADDRESSES
[email or web address]_[email or web address]_Unknown,DirectAddress_Unknown,DirectAddress_Unknown,DirectAddress_Unknown

## 2017-02-14 NOTE — PROGRESS NOTE ADULT - PROBLEM SELECTOR PLAN 4
Pt with chronic microcytic anemia likely 2/2 malignancy. Per PMD, baseline hgb around 8 since 2015.  - Started on Iron supplements. Stool occult negative.  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  Patient was supposed to return for followup which she never did.    - Will schedule patient for ongoing outpatient workup.  - Transfuse 1 unit PRBC today for hgb 7.4-7.9 in light of her cardiac history. f/u repeat CBC in am.

## 2017-02-14 NOTE — PROGRESS NOTE ADULT - SUBJECTIVE AND OBJECTIVE BOX
CARDIOLOGY NP PROGRESS NOTE    Subjective: Pt seen and examined at bedside.  Denies chest pain, dizziness, palpitations or SOB.    Overnight Events: NO events.     TELEMETRY: V-paced  EKG:      VITAL SIGNS:  T(C): 37.1, Max: 37.3 (02-13 @ 18:19)  HR: 70 (70 - 70)  BP: 100/59 (95/54 - 134/74)  RR: 16 (14 - 16)  SpO2: 99% (98% - 100%)  Wt(kg): --    I&O's Summary  I & Os for 24h ending 14 Feb 2017 07:00  =============================================  IN: 0 ml / OUT: 900 ml / NET: -900 ml    I & Os for current day (as of 14 Feb 2017 15:06)  =============================================  IN: 580 ml / OUT: 1300 ml / NET: -720 ml        PHYSICAL EXAM:    General: A/ox 3, No acute Distress  Neck: Supple, NO JVD  Cardiac: S1 S2, No M/R/G, No JVD  Pulmonary: CTAB, Breathing unlabored, No Rhonchi/Rales/Wheezing  Abdomen: Soft, Non -tender, +BS x 4 quads  Extremities: No Rashes, No edema  Neuro: A/o x 3, No focal deficits    Lines:       LABS:                          7.9    6.5   )-----------( 222      ( 14 Feb 2017 10:50 )             26.7                              14 Feb 2017 07:28    142    |  106    |  27     ----------------------------<  98     4.0     |  28     |  1.11     Ca    8.4        14 Feb 2017 07:28  Phos  3.0       13 Feb 2017 07:44  Mg     2.1       14 Feb 2017 07:28                                CAPILLARY BLOOD GLUCOSE    CARDIAC MARKERS ( 12 Feb 2017 16:11 )  0.100 ng/mL / x     / 252 U/L / x     / 3.8 ng/mL            No Known Allergies    MEDICATIONS  (STANDING):  heparin  Injectable 5000Unit(s) SubCutaneous every 8 hours  metoprolol 25milliGRAM(s) Oral every 8 hours  lisinopril 2.5milliGRAM(s) Oral daily  aspirin enteric coated 81milliGRAM(s) Oral daily  ferrous    sulfate 325milliGRAM(s) Oral every 12 hours  furosemide    Tablet 40milliGRAM(s) Oral daily    MEDICATIONS  (PRN):        DIAGNOSTIC TESTS: CARDIOLOGY NP PROGRESS NOTE    Subjective: Pt seen and examined at bedside.  Denies chest pain, dizziness, palpitations or SOB.    Overnight Events: NO events.     TELEMETRY: V-paced  EKG:      VITAL SIGNS:  T(C): 37.1, Max: 37.3 (02-13 @ 18:19)  HR: 70 (70 - 70)  BP: 100/59 (95/54 - 134/74)  RR: 16 (14 - 16)  SpO2: 99% (98% - 100%)  Wt(kg): --    I&O's Summary  I & Os for 24h ending 14 Feb 2017 07:00  =============================================  IN: 0 ml / OUT: 900 ml / NET: -900 ml    I & Os for current day (as of 14 Feb 2017 15:06)  =============================================  IN: 580 ml / OUT: 1300 ml / NET: -720 ml        PHYSICAL EXAM:    General: A/ox 3, No acute Distress  Neck: Supple,+JVD  Cardiac: S1 S2, No M/R/G, No JVD  Pulmonary: Diminished breath sounds at bases, breathing unlabored.   Abdomen: Soft, Non -tender, +BS x 4 quads  Extremities: +1 JESUS  Neuro: A/o x 3, No focal deficits    Lines:       LABS:                          7.9    6.5   )-----------( 222      ( 14 Feb 2017 10:50 )             26.7                              14 Feb 2017 07:28    142    |  106    |  27     ----------------------------<  98     4.0     |  28     |  1.11     Ca    8.4        14 Feb 2017 07:28  Phos  3.0       13 Feb 2017 07:44  Mg     2.1       14 Feb 2017 07:28                                CAPILLARY BLOOD GLUCOSE    CARDIAC MARKERS ( 12 Feb 2017 16:11 )  0.100 ng/mL / x     / 252 U/L / x     / 3.8 ng/mL            No Known Allergies    MEDICATIONS  (STANDING):  heparin  Injectable 5000Unit(s) SubCutaneous every 8 hours  metoprolol 25milliGRAM(s) Oral every 8 hours  lisinopril 2.5milliGRAM(s) Oral daily  aspirin enteric coated 81milliGRAM(s) Oral daily  ferrous    sulfate 325milliGRAM(s) Oral every 12 hours  furosemide    Tablet 40milliGRAM(s) Oral daily    MEDICATIONS  (PRN):        DIAGNOSTIC TESTS:

## 2017-02-14 NOTE — DISCHARGE NOTE ADULT - MEDICATION SUMMARY - MEDICATIONS TO TAKE
I will START or STAY ON the medications listed below when I get home from the hospital:    spironolactone 25 mg oral tablet  -- 1 tab(s) by mouth once a day  -- Indication: For Acute systolic congestive heart failure    aspirin 81 mg oral tablet  -- 1 tab(s) by mouth once a day  -- Indication: For CAD (coronary artery disease)    lisinopril 5 mg oral tablet  -- 1 tab(s) by mouth once a day  -- Indication: For CAD (coronary artery disease)    metoprolol succinate 25 mg oral tablet, extended release  -- 3 tab(s) by mouth once a day  -- Indication: For Acute systolic congestive heart failure    furosemide 20 mg oral tablet  -- 3 tab(s) by mouth once a day  -- Indication: For Acute systolic congestive heart failure    ferrous sulfate 325 mg (65 mg elemental iron) oral tablet  -- 1 tab(s) by mouth 2 times a day  -- Check with your doctor before becoming pregnant.  Do not chew, break, or crush.  May discolor urine or feces.    -- Indication: For Anemia

## 2017-02-14 NOTE — DISCHARGE NOTE ADULT - [id number]_GEN_ALL_CORE
medication teaching and assessment/teaching and training/observation and assessment/rehabilitation services

## 2017-02-14 NOTE — DISCHARGE NOTE ADULT - MEDICATION SUMMARY - MEDICATIONS TO STOP TAKING
I will STOP taking the medications listed below when I get home from the hospital:    beta blocker  --

## 2017-02-14 NOTE — DISCHARGE NOTE ADULT - PROVIDER TOKENS
TOKEN:'[id number]' TOKEN:'[id number]',FREE:[LAST:[Kenneth],FIRST:[Manuja],PHONE:[phone]],FAX:[(   )    -],ADDRESS:[[address] [place] [address]]],FREE:[LAST:[Gentilucci],FIRST:[Myke],PHONE:[phone]],FAX:[(   )    -],ADDRESS:[[address], [place] [address]]],FREE:[LAST:[Ho],FIRST:[Bal],PHONE:[phone]:[(   )    -],ADDRESS:[[address], [place] [address]]]

## 2017-02-14 NOTE — DISCHARGE NOTE ADULT - CARE PROVIDER_API CALL
Bridger Bailey), Cardiac Electrophysiology; Cardiology; Cardiovascular Disease; Internal Medicine  [address] [place] [address]  Phone: [phone]  Fax: [phone] Bridger Bailey), Cardiac Electrophysiology; Cardiology; Cardiovascular Disease; Internal Medicine  [address] [address]  Phone: [phone]  Fax: [phone]    Jovi Cooper  [address] [place] [address]  Phone: [phone]  Fax: (   )    -    Myke Hutson  [address], [place] [address]  Phone: [phone]  Fax: (   )    -    Bal Driver  [address], [place] [address]  Phone: [phone]  Fax: (   )    -

## 2017-02-14 NOTE — DISCHARGE NOTE ADULT - MEDICATION SUMMARY - MEDICATIONS TO CHANGE
I will SWITCH the dose or number of times a day I take the medications listed below when I get home from the hospital:    Lasix  --  by mouth once a day (at bedtime)

## 2017-02-15 LAB
ANION GAP SERPL CALC-SCNC: 7 MMOL/L — LOW (ref 9–16)
BUN SERPL-MCNC: 28 MG/DL — HIGH (ref 7–23)
CALCIUM SERPL-MCNC: 8.1 MG/DL — LOW (ref 8.5–10.5)
CHLORIDE SERPL-SCNC: 105 MMOL/L — SIGNIFICANT CHANGE UP (ref 96–108)
CO2 SERPL-SCNC: 30 MMOL/L — SIGNIFICANT CHANGE UP (ref 22–31)
CREAT SERPL-MCNC: 1.15 MG/DL — SIGNIFICANT CHANGE UP (ref 0.5–1.3)
GLUCOSE SERPL-MCNC: 114 MG/DL — HIGH (ref 70–99)
HCT VFR BLD CALC: 29.1 % — LOW (ref 34.5–45)
HGB BLD-MCNC: 8.8 G/DL — LOW (ref 11.5–15.5)
MAGNESIUM SERPL-MCNC: 2.3 MG/DL — SIGNIFICANT CHANGE UP (ref 1.6–2.4)
MCHC RBC-ENTMCNC: 23.5 PG — LOW (ref 27–34)
MCHC RBC-ENTMCNC: 30.2 G/DL — LOW (ref 32–36)
MCV RBC AUTO: 77.6 FL — LOW (ref 80–100)
PLATELET # BLD AUTO: 219 K/UL — SIGNIFICANT CHANGE UP (ref 150–400)
POTASSIUM SERPL-MCNC: 3.9 MMOL/L — SIGNIFICANT CHANGE UP (ref 3.5–5.3)
POTASSIUM SERPL-SCNC: 3.9 MMOL/L — SIGNIFICANT CHANGE UP (ref 3.5–5.3)
RBC # BLD: 3.75 M/UL — LOW (ref 3.8–5.2)
RBC # FLD: 15.6 % — SIGNIFICANT CHANGE UP (ref 10.3–16.9)
SODIUM SERPL-SCNC: 142 MMOL/L — SIGNIFICANT CHANGE UP (ref 135–145)
WBC # BLD: 6.9 K/UL — SIGNIFICANT CHANGE UP (ref 3.8–10.5)
WBC # FLD AUTO: 6.9 K/UL — SIGNIFICANT CHANGE UP (ref 3.8–10.5)

## 2017-02-15 PROCEDURE — 71010: CPT | Mod: 26

## 2017-02-15 PROCEDURE — 99232 SBSQ HOSP IP/OBS MODERATE 35: CPT

## 2017-02-15 RX ORDER — POTASSIUM CHLORIDE 20 MEQ
20 PACKET (EA) ORAL ONCE
Qty: 0 | Refills: 0 | Status: COMPLETED | OUTPATIENT
Start: 2017-02-15 | End: 2017-02-15

## 2017-02-15 RX ORDER — METOPROLOL TARTRATE 50 MG
75 TABLET ORAL DAILY
Qty: 0 | Refills: 0 | Status: DISCONTINUED | OUTPATIENT
Start: 2017-02-16 | End: 2017-02-18

## 2017-02-15 RX ORDER — METOPROLOL TARTRATE 50 MG
50 TABLET ORAL ONCE
Qty: 0 | Refills: 0 | Status: COMPLETED | OUTPATIENT
Start: 2017-02-15 | End: 2017-02-15

## 2017-02-15 RX ORDER — FUROSEMIDE 40 MG
40 TABLET ORAL EVERY 24 HOURS
Qty: 0 | Refills: 0 | Status: DISCONTINUED | OUTPATIENT
Start: 2017-02-15 | End: 2017-02-16

## 2017-02-15 RX ORDER — SPIRONOLACTONE 25 MG/1
25 TABLET, FILM COATED ORAL DAILY
Qty: 0 | Refills: 0 | Status: DISCONTINUED | OUTPATIENT
Start: 2017-02-15 | End: 2017-02-18

## 2017-02-15 RX ADMIN — Medication 325 MILLIGRAM(S): at 17:13

## 2017-02-15 RX ADMIN — Medication 325 MILLIGRAM(S): at 06:02

## 2017-02-15 RX ADMIN — Medication 20 MILLIEQUIVALENT(S): at 10:11

## 2017-02-15 RX ADMIN — HEPARIN SODIUM 5000 UNIT(S): 5000 INJECTION INTRAVENOUS; SUBCUTANEOUS at 13:59

## 2017-02-15 RX ADMIN — HEPARIN SODIUM 5000 UNIT(S): 5000 INJECTION INTRAVENOUS; SUBCUTANEOUS at 21:20

## 2017-02-15 RX ADMIN — SPIRONOLACTONE 25 MILLIGRAM(S): 25 TABLET, FILM COATED ORAL at 12:23

## 2017-02-15 RX ADMIN — HEPARIN SODIUM 5000 UNIT(S): 5000 INJECTION INTRAVENOUS; SUBCUTANEOUS at 06:02

## 2017-02-15 RX ADMIN — Medication 81 MILLIGRAM(S): at 11:32

## 2017-02-15 RX ADMIN — LISINOPRIL 2.5 MILLIGRAM(S): 2.5 TABLET ORAL at 06:02

## 2017-02-15 RX ADMIN — Medication 25 MILLIGRAM(S): at 06:02

## 2017-02-15 RX ADMIN — Medication 50 MILLIGRAM(S): at 13:59

## 2017-02-15 RX ADMIN — Medication 40 MILLIGRAM(S): at 06:02

## 2017-02-15 RX ADMIN — Medication 40 MILLIGRAM(S): at 12:23

## 2017-02-15 NOTE — PROGRESS NOTE ADULT - ASSESSMENT
84 y/o F with history of AVR, dual chamber PPM ([employer]) for CHB with latest generator change in 2015 at [facility],  non-obstructive CAD by cath 2014, and a recently diagnosed pancreatic mass, chronic anemia (negative guiaic) here with CHF EF 35-40% and moderate MR. Hospital course  with AT noted on admission with tracking at 120 bpm. Device reprogrammed to VVI at 70 bpm.   - Plan for BIV-PPM upgrade this Friday.  Unable to add to schedule tomorrow or today.   - Outpatient AT ablation.   - Continue Metoprolol

## 2017-02-15 NOTE — PROGRESS NOTE ADULT - SUBJECTIVE AND OBJECTIVE BOX
EPS Progress Note    S: Feels ok today. Got 1 unit of blood yesterday    O: T(C): 36.9, Max: 37.3 (02-14 @ 19:04)  HR: 70 (70 - 70)  BP: 108/66 (98/64 - 114/84)  RR: 14 (14 - 18)  SpO2: 100% (98% - 100%)    TELE:  70 bpm    PHYSICAL  General:  NAD        Chest:  decreased BS   Cardiac:  RRR, + s1/s2, + murmur   Abdomen:  +BS , soft ND/NT  Extremities: No edema    LABS:                        8.8    6.9   )-----------( 219      ( 15 Feb 2017 06:29 )             29.1     142    |  105    |  28     ----------------------------<  114    3.9     |  30     |  1.15     Ca    8.1        15 Feb 2017 06:29  Mg     2.3       15 Feb 2017 06:29    MEDICATIONS:  heparin  Injectable 5000Unit(s) SubCutaneous every 8 hours  lisinopril 2.5milliGRAM(s) Oral daily  aspirin enteric coated 81milliGRAM(s) Oral daily  ferrous    sulfate 325milliGRAM(s) Oral every 12 hours  metoprolol succinate ER 50milliGRAM(s) Oral once  furosemide   Injectable 40milliGRAM(s) IV Push every 24 hours  spironolactone 25milliGRAM(s) Oral daily

## 2017-02-15 NOTE — PROGRESS NOTE ADULT - PROBLEM SELECTOR PLAN 2
s/p CABG 1997 (LIMA to LAD) and non obstructive CAD by cath in 2014.  - Elevated trops likely demand in the setting of mild CHF and tachycardia and have been downtrending.  - Continue Metoprolol and Aspirin. LDL 51. NO statin for now.

## 2017-02-15 NOTE — PROGRESS NOTE ADULT - PROBLEM SELECTOR PLAN 4
Pt with chronic microcytic anemia likely 2/2 malignancy. Per PMD, baseline hgb around 8 since 2015.  - Started on Iron supplements. Stool occult negative.  - Per PMD, pt had a pancreatic mass s/p FNA + for cystic neoplasm.  Patient was supposed to return for followup which she never did.    - Will schedule patient for ongoing outpatient workup.  - Transfuse 1 unit PRBC today for hgb 7.4-7.9 in light of her cardiac history. Repeat CBC in am stable.

## 2017-02-15 NOTE — PROGRESS NOTE ADULT - SUBJECTIVE AND OBJECTIVE BOX
CARDIOLOGY NP PROGRESS NOTE    Subjective: Pt seen and examined at bedside. Denies chest pain/sob    Overnight Events: None    TELEMETRY: No events      VITAL SIGNS:  T(C): 36.9, Max: 37.3 (02-14 @ 19:04)  HR: 70 (70 - 70)  BP: 108/66 (98/64 - 114/84)  RR: 14 (14 - 18)  SpO2: 100% (98% - 100%)  Wt(kg): --    I&O's Summary  I & Os for 24h ending 15 Feb 2017 07:00  =============================================  IN: 1160 ml / OUT: 1700 ml / NET: -540 ml    I & Os for current day (as of 15 Feb 2017 13:21)  =============================================  IN: 340 ml / OUT: 650 ml / NET: -310 ml        PHYSICAL EXAM:    General: A/ox 3, No acute Distress  Neck: Supple, + JVD  Cardiac: S1 S2, No M/R/G, No JVD  Pulmonary: Lungs diminished bilaterally, Breathing unlabored  Abdomen: Soft, Non -tender, +BS x 4 quads  Extremities: No Rashes, Satinder LE edema 1-2+  Neuro: A/o x 3, No focal deficits      LABS:                      8.8    6.9   )-----------( 219      ( 15 Feb 2017 06:29 )             29.1                              15 Feb 2017 06:29    142    |  105    |  28     ----------------------------<  114    3.9     |  30     |  1.15     Ca    8.1        15 Feb 2017 06:29  Mg     2.3       15 Feb 2017 06:29                             No Known Allergies    MEDICATIONS  (STANDING):  heparin  Injectable 5000Unit(s) SubCutaneous every 8 hours  lisinopril 2.5milliGRAM(s) Oral daily  aspirin enteric coated 81milliGRAM(s) Oral daily  ferrous    sulfate 325milliGRAM(s) Oral every 12 hours  metoprolol succinate ER 50milliGRAM(s) Oral once  furosemide   Injectable 40milliGRAM(s) IV Push every 24 hours  spironolactone 25milliGRAM(s) Oral daily

## 2017-02-15 NOTE — PROGRESS NOTE ADULT - PROBLEM SELECTOR PLAN 3
Echo 2/13 shows severe LVH with moderate global hypokinesis of the LV (EF 35-40%) which has decreased from 57% noted on Echo 9/2016.  - Etiology likely tachy induced.  Ischemia unlikely in light of recent cath in 2014 showing non obstructive CAD.   - Clinically volume overloaded with +JVD, diminished breath sounds at bases, 1-2+ JESUS, SOB on exertion. CXR performed today with CM, mild interstitial pulm edema.  -Switch to Lasix 40mg IV today. Received Lasix 20mg IV in additional to 40mg PO with 1 unit of PRBC yesterday. Monitor strict I's and O's and daily weights  - Continue Metoprolol and Lisinopril.  -Start incentive spirometer

## 2017-02-15 NOTE — PROGRESS NOTE ADULT - ASSESSMENT
Patient is a 85 year old female with pmhx of CAD s/p CABG 1997 (LIMA to LAD), severe AS s/p TAVR (2014), PPM, Diastolic Heart Failure (EF 57% in 9/2016), Pancreatic CA who presents to [facility] with near syncope, palpitations found to be in Atach and decompensated CHF being evaluated by EP for possible Atach ablation and BIV upgrade.

## 2017-02-15 NOTE — PROGRESS NOTE ADULT - PROBLEM SELECTOR PLAN 1
Etiology likely 2/2 paroxysmal atrial tachycardia. Of note, pt has a recent admission to [facility] in Dec/2016 for "dizzy spells" and was discharged on Amiodarone presumably for an atrial arrhythmia.   -  Per EP, pt has A-tach with PPM tracking the atrial rate.  They recommend Atach ablation and BIV upgrade as patient has underlying CHB and will be PPM dependant. - Per EP anticipate BiV PPM upgrade Fri 2/17/17, will likely need Atach ablation as outpatient setting.   - Continue med mgmt, switched to long acting Toprol XL 75mg today

## 2017-02-16 DIAGNOSIS — Z95.0 PRESENCE OF CARDIAC PACEMAKER: ICD-10-CM

## 2017-02-16 LAB
ANION GAP SERPL CALC-SCNC: 7 MMOL/L — LOW (ref 9–16)
BUN SERPL-MCNC: 29 MG/DL — HIGH (ref 7–23)
CALCIUM SERPL-MCNC: 8.2 MG/DL — LOW (ref 8.5–10.5)
CHLORIDE SERPL-SCNC: 106 MMOL/L — SIGNIFICANT CHANGE UP (ref 96–108)
CO2 SERPL-SCNC: 29 MMOL/L — SIGNIFICANT CHANGE UP (ref 22–31)
CREAT SERPL-MCNC: 1.13 MG/DL — SIGNIFICANT CHANGE UP (ref 0.5–1.3)
GLUCOSE SERPL-MCNC: 116 MG/DL — HIGH (ref 70–99)
HCT VFR BLD CALC: 28.5 % — LOW (ref 34.5–45)
HGB BLD-MCNC: 8.6 G/DL — LOW (ref 11.5–15.5)
MAGNESIUM SERPL-MCNC: 2.3 MG/DL — SIGNIFICANT CHANGE UP (ref 1.6–2.4)
MCHC RBC-ENTMCNC: 23.5 PG — LOW (ref 27–34)
MCHC RBC-ENTMCNC: 30.2 G/DL — LOW (ref 32–36)
MCV RBC AUTO: 77.9 FL — LOW (ref 80–100)
PLATELET # BLD AUTO: 191 K/UL — SIGNIFICANT CHANGE UP (ref 150–400)
POTASSIUM SERPL-MCNC: 4.1 MMOL/L — SIGNIFICANT CHANGE UP (ref 3.5–5.3)
POTASSIUM SERPL-SCNC: 4.1 MMOL/L — SIGNIFICANT CHANGE UP (ref 3.5–5.3)
RBC # BLD: 3.66 M/UL — LOW (ref 3.8–5.2)
RBC # FLD: 15.8 % — SIGNIFICANT CHANGE UP (ref 10.3–16.9)
SODIUM SERPL-SCNC: 142 MMOL/L — SIGNIFICANT CHANGE UP (ref 135–145)
WBC # BLD: 7.7 K/UL — SIGNIFICANT CHANGE UP (ref 3.8–10.5)
WBC # FLD AUTO: 7.7 K/UL — SIGNIFICANT CHANGE UP (ref 3.8–10.5)

## 2017-02-16 PROCEDURE — 99232 SBSQ HOSP IP/OBS MODERATE 35: CPT

## 2017-02-16 RX ORDER — LISINOPRIL 2.5 MG/1
5 TABLET ORAL DAILY
Qty: 0 | Refills: 0 | Status: DISCONTINUED | OUTPATIENT
Start: 2017-02-17 | End: 2017-02-18

## 2017-02-16 RX ORDER — FUROSEMIDE 40 MG
60 TABLET ORAL DAILY
Qty: 0 | Refills: 0 | Status: DISCONTINUED | OUTPATIENT
Start: 2017-02-17 | End: 2017-02-18

## 2017-02-16 RX ADMIN — Medication 325 MILLIGRAM(S): at 06:42

## 2017-02-16 RX ADMIN — Medication 325 MILLIGRAM(S): at 17:55

## 2017-02-16 RX ADMIN — HEPARIN SODIUM 5000 UNIT(S): 5000 INJECTION INTRAVENOUS; SUBCUTANEOUS at 13:31

## 2017-02-16 RX ADMIN — SPIRONOLACTONE 25 MILLIGRAM(S): 25 TABLET, FILM COATED ORAL at 06:42

## 2017-02-16 RX ADMIN — HEPARIN SODIUM 5000 UNIT(S): 5000 INJECTION INTRAVENOUS; SUBCUTANEOUS at 22:21

## 2017-02-16 RX ADMIN — LISINOPRIL 2.5 MILLIGRAM(S): 2.5 TABLET ORAL at 06:42

## 2017-02-16 RX ADMIN — HEPARIN SODIUM 5000 UNIT(S): 5000 INJECTION INTRAVENOUS; SUBCUTANEOUS at 06:42

## 2017-02-16 RX ADMIN — Medication 81 MILLIGRAM(S): at 11:56

## 2017-02-16 RX ADMIN — Medication 40 MILLIGRAM(S): at 11:56

## 2017-02-16 RX ADMIN — Medication 75 MILLIGRAM(S): at 06:42

## 2017-02-16 NOTE — PROGRESS NOTE ADULT - SUBJECTIVE AND OBJECTIVE BOX
CARDIOLOGY NP PROGRESS NOTE    Subjective: Pt seen and examined at bedside.  Denies chest pain, dizziness, palpitations or SOB.    Overnight Events: NO events.    TELEMETRY: Vpaced.   EKG:      VITAL SIGNS:  T(C): 37.6, Max: 37.6 (02-15 @ 18:10)  HR: 70 (70 - 70)  BP: 109/61 (95/50 - 132/65)  RR: 18 (14 - 18)  SpO2: 97% (96% - 100%)  Wt(kg): --    I&O's Summary  I & Os for 24h ending 16 Feb 2017 07:00  =============================================  IN: 818 ml / OUT: 1350 ml / NET: -532 ml    I & Os for current day (as of 16 Feb 2017 12:05)  =============================================  IN: 0 ml / OUT: 0 ml / NET: 0 ml        PHYSICAL EXAM:    General: A/ox 3, No acute Distress  Neck: Supple, +JVD  Cardiac: S1 S2, No M/R/G,  Pulmonary: Diminished breath sounds at bases  Abdomen: Soft, Non -tender, +BS x 4 quads  Extremities: Trace JESUS  Neuro: A/o x 3, No focal deficits    Lines:       LABS:                          8.6    7.7   )-----------( 191      ( 16 Feb 2017 06:22 )             28.5                              16 Feb 2017 06:22    142    |  106    |  29     ----------------------------<  116    4.1     |  29     |  1.13     Ca    8.2        16 Feb 2017 06:22  Mg     2.3       16 Feb 2017 06:22                                CAPILLARY BLOOD GLUCOSE              No Known Allergies    MEDICATIONS  (STANDING):  heparin  Injectable 5000Unit(s) SubCutaneous every 8 hours  aspirin enteric coated 81milliGRAM(s) Oral daily  ferrous    sulfate 325milliGRAM(s) Oral every 12 hours  metoprolol succinate ER 75milliGRAM(s) Oral daily  furosemide   Injectable 40milliGRAM(s) IV Push every 24 hours  spironolactone 25milliGRAM(s) Oral daily    MEDICATIONS  (PRN):        DIAGNOSTIC TESTS:

## 2017-02-16 NOTE — PROGRESS NOTE ADULT - PROBLEM SELECTOR PLAN 5
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  Patient was supposed to return for followup which she never did.    - Will schedule patient for ongoing outpatient workup with Dr. Bal Driver from [facility] Per PMD, pt was diagnosed with pancreatic CA s/p FNA + for papillary mucinous neoplasm about 6 months ago.  Patient was supposed to return for followup which she never did.    - Will schedule patient for ongoing outpatient workup and treatment with Dr. Bal Driver from [facility]

## 2017-02-16 NOTE — PROGRESS NOTE ADULT - PROBLEM SELECTOR PLAN 3
Echo 2/13 shows severe LVH with moderate global hypokinesis of the LV (EF 35-40%) which has decreased from 57% noted on Echo 9/2016.  - Etiology likely tachy induced.  Ischemia unlikely in light of recent cath in 2014 showing non obstructive CAD.   - Clinically volume overloaded with +JVD, diminished breath sounds at bases, 1-2+ JESUS, SOB on exertion. CXR performed today with CM, mild interstitial pulm edema.  -Switch to Lasix 40mg IV today. Received Lasix 20mg IV in additional to 40mg PO with 1 unit of PRBC yesterday. Monitor strict I's and O's and daily weights  - Continue Metoprolol and Lisinopril.  -Start incentive spirometer Echo 2/13 shows severe LVH with moderate global hypokinesis of the LV (EF 35-40%) which has decreased from 57% noted on Echo 9/2016.  - Etiology likely tachy induced.  Ischemia unlikely in light of recent cath in 2014 showing non obstructive CAD.   - Clinically volume overloaded with +JVD, diminished breath sounds at bases, 1-2+ JESUS, SOB on exertion. CXR performed yesterday with mild interstitial edema.  - Continue With lasix 40 mg IV today and transition to PO tomm. Continue to monitor strict I's and O's and daily weights. Goal net negative 1 liter/24 hours.  - Continue Metoprolol and Lisinopril.  -Start incentive spirometer

## 2017-02-16 NOTE — PROGRESS NOTE ADULT - ASSESSMENT
86 y/o F with history of AVR, dual chamber PPM ([employer]) for CHB with latest generator change in 2015 at [facility],  non-obstructive CAD by cath 2014, and a recently diagnosed pancreatic mass, chronic anemia (negative guiaic) here with CHF EF 35-40% and moderate MR. Hospital course  with AT noted on admission with tracking at 120 bpm. Device reprogrammed to VVI at 70 bpm.   - Plan for BIV-PPM upgrade tommorow   - Outpatient AT ablation.   - Continue Metoprolol

## 2017-02-16 NOTE — PROGRESS NOTE ADULT - PROBLEM SELECTOR PLAN 1
Etiology likely 2/2 paroxysmal atrial tachycardia. Of note, pt has a recent admission to [facility] in Dec/2016 for "dizzy spells" and was discharged on Amiodarone presumably for an atrial arrhythmia.   -  Per EP, pt has A-tach with PPM tracking the atrial rate.  They recommend Atach ablation and BIV upgrade as patient has underlying CHB and will be PPM dependant. - Per EP anticipate BiV PPM upgrade Fri 2/17/17, will likely need Atach ablation as outpatient setting.   - Continue Toprol Xl 75 mg daily. Etiology likely 2/2 paroxysmal atrial tachycardia. Of note, pt has a recent admission to [facility] in Dec/2016 for "dizzy spells" and was discharged on Amiodarone presumably for an atrial arrhythmia.   -  Per EP, pt has A-tach with PPM tracking the atrial rate.  They recommend Atach ablation and BIV upgrade as patient has underlying CHB and will be PPM dependant. - Per EP, BiV PPM upgrade tomm. Will likely need Atach ablation in the outpatient setting.   - Continue Toprol Xl 75 mg daily.

## 2017-02-16 NOTE — PROGRESS NOTE ADULT - ASSESSMENT
Patient is a 85 year old female with pmhx of CAD s/p CABG 1997 (LIMA to LAD), severe AS s/p TAVR (2014), PPM, Diastolic Heart Failure (EF 57% in 9/2016), Pancreatic CA who presents to [facility] with near syncope, palpitations found to be in Atach and decompensated CHF being evaluated by EP for possible Atach ablation and BIV upgrade.

## 2017-02-16 NOTE — PROGRESS NOTE ADULT - SUBJECTIVE AND OBJECTIVE BOX
EPS Progress Note    S: sitting up in bed. Family member at bedside     MEDICATIONS  (STANDING):  heparin  Injectable 5000Unit(s) SubCutaneous every 8 hours  aspirin enteric coated 81milliGRAM(s) Oral daily  ferrous    sulfate 325milliGRAM(s) Oral every 12 hours  metoprolol succinate ER 75milliGRAM(s) Oral daily  spironolactone 25milliGRAM(s) Oral daily      Telemetry:                                                          8.6    7.7   )-----------( 191      ( 16 Feb 2017 06:22 )             28.5     16 Feb 2017 06:22    142    |  106    |  29     ----------------------------<  116    4.1     |  29     |  1.13     Ca    8.2        16 Feb 2017 06:22  Mg     2.3       16 Feb 2017 06:22

## 2017-02-16 NOTE — PROGRESS NOTE ADULT - PROBLEM SELECTOR PLAN 4
Pt with chronic microcytic anemia likely 2/2 malignancy. Per PMD, baseline hgb around 8 since 2015. s/p 1 unit PRBC on this admission.  - Started on Iron supplements. Stool occult negative.  - Pt with chronic microcytic anemia likely 2/2 malignancy. Per PMD, baseline hgb around 8 since 2015. s/p 1 unit PRBC on this admission.  - Started on Iron supplements. Stool occult negative.  - Continue to monitor cbc.

## 2017-02-17 LAB
ANION GAP SERPL CALC-SCNC: 7 MMOL/L — LOW (ref 9–16)
BUN SERPL-MCNC: 27 MG/DL — HIGH (ref 7–23)
CALCIUM SERPL-MCNC: 8.5 MG/DL — SIGNIFICANT CHANGE UP (ref 8.5–10.5)
CHLORIDE SERPL-SCNC: 104 MMOL/L — SIGNIFICANT CHANGE UP (ref 96–108)
CO2 SERPL-SCNC: 30 MMOL/L — SIGNIFICANT CHANGE UP (ref 22–31)
CREAT SERPL-MCNC: 1.21 MG/DL — SIGNIFICANT CHANGE UP (ref 0.5–1.3)
GLUCOSE SERPL-MCNC: 101 MG/DL — HIGH (ref 70–99)
HCT VFR BLD CALC: 29.2 % — LOW (ref 34.5–45)
HGB BLD-MCNC: 8.6 G/DL — LOW (ref 11.5–15.5)
INR BLD: 1.13 — SIGNIFICANT CHANGE UP (ref 0.88–1.16)
MAGNESIUM SERPL-MCNC: 2.3 MG/DL — SIGNIFICANT CHANGE UP (ref 1.6–2.4)
MCHC RBC-ENTMCNC: 23.1 PG — LOW (ref 27–34)
MCHC RBC-ENTMCNC: 29.5 G/DL — LOW (ref 32–36)
MCV RBC AUTO: 78.3 FL — LOW (ref 80–100)
PLATELET # BLD AUTO: 208 K/UL — SIGNIFICANT CHANGE UP (ref 150–400)
POTASSIUM SERPL-MCNC: 4.1 MMOL/L — SIGNIFICANT CHANGE UP (ref 3.5–5.3)
POTASSIUM SERPL-SCNC: 4.1 MMOL/L — SIGNIFICANT CHANGE UP (ref 3.5–5.3)
PROTHROM AB SERPL-ACNC: 12.6 SEC — SIGNIFICANT CHANGE UP (ref 10–13.1)
RBC # BLD: 3.73 M/UL — LOW (ref 3.8–5.2)
RBC # FLD: 16 % — SIGNIFICANT CHANGE UP (ref 10.3–16.9)
SODIUM SERPL-SCNC: 141 MMOL/L — SIGNIFICANT CHANGE UP (ref 135–145)
WBC # BLD: 7.2 K/UL — SIGNIFICANT CHANGE UP (ref 3.8–10.5)
WBC # FLD AUTO: 7.2 K/UL — SIGNIFICANT CHANGE UP (ref 3.8–10.5)

## 2017-02-17 PROCEDURE — 33224 INSERT PACING LEAD & CONNECT: CPT

## 2017-02-17 PROCEDURE — 93010 ELECTROCARDIOGRAM REPORT: CPT

## 2017-02-17 PROCEDURE — 99233 SBSQ HOSP IP/OBS HIGH 50: CPT

## 2017-02-17 RX ORDER — ACETAMINOPHEN 500 MG
650 TABLET ORAL EVERY 4 HOURS
Qty: 0 | Refills: 0 | Status: DISCONTINUED | OUTPATIENT
Start: 2017-02-17 | End: 2017-02-18

## 2017-02-17 RX ORDER — VANCOMYCIN HCL 1 G
1000 VIAL (EA) INTRAVENOUS ONCE
Qty: 0 | Refills: 0 | Status: COMPLETED | OUTPATIENT
Start: 2017-02-17 | End: 2017-02-17

## 2017-02-17 RX ADMIN — Medication 60 MILLIGRAM(S): at 12:49

## 2017-02-17 RX ADMIN — Medication 325 MILLIGRAM(S): at 17:06

## 2017-02-17 RX ADMIN — HEPARIN SODIUM 5000 UNIT(S): 5000 INJECTION INTRAVENOUS; SUBCUTANEOUS at 06:55

## 2017-02-17 RX ADMIN — HEPARIN SODIUM 5000 UNIT(S): 5000 INJECTION INTRAVENOUS; SUBCUTANEOUS at 21:26

## 2017-02-17 RX ADMIN — Medication 81 MILLIGRAM(S): at 12:49

## 2017-02-17 RX ADMIN — Medication 75 MILLIGRAM(S): at 06:55

## 2017-02-17 RX ADMIN — SPIRONOLACTONE 25 MILLIGRAM(S): 25 TABLET, FILM COATED ORAL at 06:54

## 2017-02-17 RX ADMIN — Medication 325 MILLIGRAM(S): at 06:54

## 2017-02-17 RX ADMIN — Medication 250 MILLIGRAM(S): at 09:05

## 2017-02-17 RX ADMIN — LISINOPRIL 5 MILLIGRAM(S): 2.5 TABLET ORAL at 12:49

## 2017-02-17 NOTE — CHART NOTE - NSCHARTNOTEFT_GEN_A_CORE
Patient seen and examined earlier today.  S/p upgrade to BiV. Tolerated procedure without event.  Ambulating without event. No cp/sob/palpitations. On exam PPM bandage c/d/i. RRR nls1s2.  Trace pedal event.  Bloodwork/tele reviewed.  Reviewed with patient hospital course and discharge plan. Discussed medications and importance of follow-up. Patient eager to go home and plans to follow-up with doctors at [facility].  Case reviewed with housestaff.

## 2017-02-17 NOTE — PROGRESS NOTE ADULT - PROBLEM SELECTOR PLAN 1
Etiology likely 2/2 paroxysmal atrial tachycardia. Patient s/p BIV upgrade today by EP. Patient to follow as outpatient and have Atach ablation  scheduled at later date.  - Continue Toprol Xl 75 mg daily.

## 2017-02-17 NOTE — DIETITIAN INITIAL EVALUATION ADULT. - OTHER INFO
patient reported she has been eating less.Resides with son and grandson,however,does not eat their food.Has been eating only oatmeal of recent.Does not avoid salt or cholesterol in diet.

## 2017-02-17 NOTE — PROGRESS NOTE ADULT - ASSESSMENT
Patient is a 85 year old female with pmhx of CAD s/p CABG 1997 (LIMA to LAD), severe AS s/p TAVR (2014), PPM, Diastolic Heart Failure (EF 57% in 9/2016), Pancreatic CA who presents to [facility] with near syncope, palpitations found to be in Atach and decompensated CHF being evaluated by EP. s/p BIV upgrade with plan for outpatient Atach ablation.

## 2017-02-17 NOTE — PROGRESS NOTE ADULT - SUBJECTIVE AND OBJECTIVE BOX
INTERVAL HPI/OVERNIGHT EVENTS:    VITAL SIGNS:  T(F): 99.1  HR: 80  BP: 107/52  RR: 18  SpO2: 98%  Wt(kg): --  I & Os for 24h ending 02-17 @ 07:00  =============================================  IN: 300 ml / OUT: 500 ml / NET: -200 ml    I & Os for current day (as of 02-17 @ 22:47)  =============================================  IN: 680 ml / OUT: 2090 ml / NET: -1410 ml      PHYSICAL EXAM:    Constitutional:  Eyes:  ENMT:  Neck:  Respiratory:  Cardiovascular:  Gastrointestinal:  Extremities:  Vascular:  Neurological:  Musculoskeletal:    MEDICATIONS  (STANDING):  heparin  Injectable 5000Unit(s) SubCutaneous every 8 hours  aspirin enteric coated 81milliGRAM(s) Oral daily  ferrous    sulfate 325milliGRAM(s) Oral every 12 hours  metoprolol succinate ER 75milliGRAM(s) Oral daily  spironolactone 25milliGRAM(s) Oral daily  lisinopril 5milliGRAM(s) Oral daily  furosemide    Tablet 60milliGRAM(s) Oral daily    MEDICATIONS  (PRN):  acetaminophen   Tablet. 650milliGRAM(s) Oral every 4 hours PRN Moderate Pain (4 - 6)      Allergies    No Known Allergies    Intolerances        LABS:                        8.6    7.2   )-----------( 208      ( 17 Feb 2017 06:21 )             29.2     17 Feb 2017 06:49    141    |  104    |  27     ----------------------------<  101    4.1     |  30     |  1.21     Ca    8.5        17 Feb 2017 06:49  Mg     2.3       17 Feb 2017 06:49      PT/INR - ( 17 Feb 2017 06:21 )   PT: 12.6 sec;   INR: 1.13                RADIOLOGY & ADDITIONAL TESTS: INTERVAL HPI/OVERNIGHT EVENTS: NAYA. Patient with no chest pain, SOB, dyspnea, nausea, vomiting, headache.     VITAL SIGNS:  T(F): 99.1  HR: 80  BP: 107/52  RR: 18  SpO2: 98%  Wt(kg): --  I & Os for 24h ending 02-17 @ 07:00  =============================================  IN: 300 ml / OUT: 500 ml / NET: -200 ml    I & Os for current day (as of 02-17 @ 22:47)  =============================================  IN: 680 ml / OUT: 2090 ml / NET: -1410 ml      PHYSICAL EXAM:    Constitutional:  Eyes:  ENMT:  Neck:  Respiratory:  Cardiovascular:  Gastrointestinal:  Extremities:  Vascular:  Neurological:  Musculoskeletal:    MEDICATIONS  (STANDING):  heparin  Injectable 5000Unit(s) SubCutaneous every 8 hours  aspirin enteric coated 81milliGRAM(s) Oral daily  ferrous    sulfate 325milliGRAM(s) Oral every 12 hours  metoprolol succinate ER 75milliGRAM(s) Oral daily  spironolactone 25milliGRAM(s) Oral daily  lisinopril 5milliGRAM(s) Oral daily  furosemide    Tablet 60milliGRAM(s) Oral daily    MEDICATIONS  (PRN):  acetaminophen   Tablet. 650milliGRAM(s) Oral every 4 hours PRN Moderate Pain (4 - 6)      Allergies    No Known Allergies    Intolerances        LABS:                        8.6    7.2   )-----------( 208      ( 17 Feb 2017 06:21 )             29.2     17 Feb 2017 06:49    141    |  104    |  27     ----------------------------<  101    4.1     |  30     |  1.21     Ca    8.5        17 Feb 2017 06:49  Mg     2.3       17 Feb 2017 06:49      PT/INR - ( 17 Feb 2017 06:21 )   PT: 12.6 sec;   INR: 1.13                RADIOLOGY & ADDITIONAL TESTS:

## 2017-02-17 NOTE — PROGRESS NOTE ADULT - PROBLEM SELECTOR PLAN 4
Pt with chronic microcytic anemia likely 2/2 malignancy. Per PMD, baseline hgb around 8 since 2015. s/p 1 unit PRBC on this admission.  - Started on Iron supplements. Stool occult negative.  - Continue to monitor cbc.

## 2017-02-17 NOTE — DIETITIAN INITIAL EVALUATION ADULT. - ENERGY NEEDS
BMI:21.9,IBW:135lbs,98% of IBW,Stated UBW:150lbs about 1-2 months ago.No skin breakdown.No N/V/D.Fluids per MD for possible CHF

## 2017-02-17 NOTE — PROGRESS NOTE ADULT - PROBLEM SELECTOR PLAN 3
Echo 2/13 shows severe LVH with moderate global hypokinesis of the LV (EF 35-40%) which has decreased from 57% noted on Echo 9/2016.  - Etiology likely tachy induced.  Ischemia unlikely in light of recent cath in 2014 showing non obstructive CAD.   - Clinically volume overloaded with +JVD, diminished breath sounds at bases, 1-2+ JESUS, SOB on exertion. CXR performed yesterday with mild interstitial edema.  - Continue With lasix 40 mg IV today and transition to PO tomm. Continue to monitor strict I's and O's and daily weights. Goal net negative 1 liter/24 hours.  - Continue Metoprolol and Lisinopril.  -Start incentive spirometer

## 2017-02-17 NOTE — PROGRESS NOTE ADULT - PROBLEM SELECTOR PLAN 5
Per PMD, pt was diagnosed with pancreatic CA s/p FNA + for papillary mucinous neoplasm about 6 months ago.  Patient was supposed to return for followup which she never did.    - Will schedule patient for ongoing outpatient workup and treatment with Dr. Bal Driver from [facility]

## 2017-02-18 VITALS
OXYGEN SATURATION: 97 % | HEART RATE: 79 BPM | DIASTOLIC BLOOD PRESSURE: 44 MMHG | SYSTOLIC BLOOD PRESSURE: 119 MMHG | RESPIRATION RATE: 15 BRPM

## 2017-02-18 LAB
ANION GAP SERPL CALC-SCNC: 6 MMOL/L — LOW (ref 9–16)
BUN SERPL-MCNC: 25 MG/DL — HIGH (ref 7–23)
CALCIUM SERPL-MCNC: 8.6 MG/DL — SIGNIFICANT CHANGE UP (ref 8.5–10.5)
CHLORIDE SERPL-SCNC: 104 MMOL/L — SIGNIFICANT CHANGE UP (ref 96–108)
CO2 SERPL-SCNC: 30 MMOL/L — SIGNIFICANT CHANGE UP (ref 22–31)
CREAT SERPL-MCNC: 1.09 MG/DL — SIGNIFICANT CHANGE UP (ref 0.5–1.3)
GLUCOSE SERPL-MCNC: 102 MG/DL — HIGH (ref 70–99)
HCT VFR BLD CALC: 28.9 % — LOW (ref 34.5–45)
HGB BLD-MCNC: 8.7 G/DL — LOW (ref 11.5–15.5)
MAGNESIUM SERPL-MCNC: 2.5 MG/DL — HIGH (ref 1.6–2.4)
MCHC RBC-ENTMCNC: 23.6 PG — LOW (ref 27–34)
MCHC RBC-ENTMCNC: 30.1 G/DL — LOW (ref 32–36)
MCV RBC AUTO: 78.5 FL — LOW (ref 80–100)
PLATELET # BLD AUTO: 218 K/UL — SIGNIFICANT CHANGE UP (ref 150–400)
POTASSIUM SERPL-MCNC: 4.3 MMOL/L — SIGNIFICANT CHANGE UP (ref 3.5–5.3)
POTASSIUM SERPL-SCNC: 4.3 MMOL/L — SIGNIFICANT CHANGE UP (ref 3.5–5.3)
RBC # BLD: 3.68 M/UL — LOW (ref 3.8–5.2)
RBC # FLD: 16.5 % — SIGNIFICANT CHANGE UP (ref 10.3–16.9)
SODIUM SERPL-SCNC: 140 MMOL/L — SIGNIFICANT CHANGE UP (ref 135–145)
WBC # BLD: 8.3 K/UL — SIGNIFICANT CHANGE UP (ref 3.8–10.5)
WBC # FLD AUTO: 8.3 K/UL — SIGNIFICANT CHANGE UP (ref 3.8–10.5)

## 2017-02-18 PROCEDURE — 93281 PM DEVICE PROGR EVAL MULTI: CPT | Mod: 26

## 2017-02-18 PROCEDURE — 71020: CPT | Mod: 26

## 2017-02-18 RX ORDER — FERROUS SULFATE 325(65) MG
1 TABLET ORAL
Qty: 60 | Refills: 0 | OUTPATIENT
Start: 2017-02-18 | End: 2017-03-20

## 2017-02-18 RX ORDER — LISINOPRIL 2.5 MG/1
1 TABLET ORAL
Qty: 30 | Refills: 0 | OUTPATIENT
Start: 2017-02-18 | End: 2017-03-20

## 2017-02-18 RX ORDER — FUROSEMIDE 40 MG
3 TABLET ORAL
Qty: 90 | Refills: 0 | OUTPATIENT
Start: 2017-02-18 | End: 2017-03-20

## 2017-02-18 RX ORDER — FUROSEMIDE 40 MG
0 TABLET ORAL
Qty: 0 | Refills: 0 | COMMUNITY

## 2017-02-18 RX ORDER — SPIRONOLACTONE 25 MG/1
1 TABLET, FILM COATED ORAL
Qty: 30 | Refills: 0 | OUTPATIENT
Start: 2017-02-18 | End: 2017-03-20

## 2017-02-18 RX ORDER — METOPROLOL TARTRATE 50 MG
3 TABLET ORAL
Qty: 90 | Refills: 0 | OUTPATIENT
Start: 2017-02-18 | End: 2017-03-20

## 2017-02-18 RX ADMIN — HEPARIN SODIUM 5000 UNIT(S): 5000 INJECTION INTRAVENOUS; SUBCUTANEOUS at 07:16

## 2017-02-18 RX ADMIN — Medication 81 MILLIGRAM(S): at 10:07

## 2017-02-18 RX ADMIN — Medication 60 MILLIGRAM(S): at 10:07

## 2017-02-18 RX ADMIN — SPIRONOLACTONE 25 MILLIGRAM(S): 25 TABLET, FILM COATED ORAL at 07:16

## 2017-02-18 RX ADMIN — Medication 325 MILLIGRAM(S): at 07:16

## 2017-02-18 RX ADMIN — LISINOPRIL 5 MILLIGRAM(S): 2.5 TABLET ORAL at 10:07

## 2017-02-18 RX ADMIN — Medication 75 MILLIGRAM(S): at 07:16

## 2017-02-18 NOTE — PROGRESS NOTE ADULT - SUBJECTIVE AND OBJECTIVE BOX
EPS Progress Note    S: The patient is without overnight events or complaints.  Denies any pain or discomfort at site of device implant.      T(C): 37.4, Max: 37.4 (02-18 @ 09:00)  HR: 79 (74 - 83)  BP: 119/44 (101/79 - 128/68)  RR: 15 (15 - 18)  SpO2: 97% (97% - 100%)  Wt(kg): --     Telemetry: biv paced          General:  No acute distress      Chest:  Chest is clear to auscultation bilaterally without wheezes, crackles, or rhonchi  Device site:  Site is healing well without signs of hematoma or infection  Cardiac:  Regular rate and rhythm.  No murmur, rubs, or gallops heard.  Abdomen:  Soft without rebound or guarding.  Bowel sounds are presnt in all 4 quadrants.  No hepatosplenomegaly  Extremities:  No lower extremity edema is present.  No cyanosis or clubbing       MEDICATIONS  (STANDING):  heparin  Injectable 5000Unit(s) SubCutaneous every 8 hours  aspirin enteric coated 81milliGRAM(s) Oral daily  ferrous    sulfate 325milliGRAM(s) Oral every 12 hours  metoprolol succinate ER 75milliGRAM(s) Oral daily  spironolactone 25milliGRAM(s) Oral daily  lisinopril 5milliGRAM(s) Oral daily  furosemide    Tablet 60milliGRAM(s) Oral daily    MEDICATIONS  (PRN):  acetaminophen   Tablet. 650milliGRAM(s) Oral every 4 hours PRN Moderate Pain (4 - 6)         Device Intterogation:  device parameters reviewed and device is functioning well    Labs:                                                               8.7    8.3   )-----------( 218      ( 18 Feb 2017 08:08 )             28.9     18 Feb 2017 08:08    140    |  104    |  25     ----------------------------<  102    4.3     |  30     |  1.09     Ca    8.6        18 Feb 2017 08:08  Mg     2.5       18 Feb 2017 08:08      PT/INR - ( 17 Feb 2017 06:21 )   PT: 12.6 sec;   INR: 1.13       CXR:  Device position is appropriate and no evidence of pneumothorax      Assessment/Plan:    84 y/o female AVR, complete heart block s/p dual chamber PPM, with new cardiomyopathy now s/p BiV upgrade    -device functioning well  -ok to discharge home with f/u Dr. Bailey

## 2017-02-22 DIAGNOSIS — I44.2 ATRIOVENTRICULAR BLOCK, COMPLETE: ICD-10-CM

## 2017-02-22 DIAGNOSIS — Z95.0 PRESENCE OF CARDIAC PACEMAKER: ICD-10-CM

## 2017-02-22 DIAGNOSIS — Z66 DO NOT RESUSCITATE: ICD-10-CM

## 2017-02-22 DIAGNOSIS — Z95.1 PRESENCE OF AORTOCORONARY BYPASS GRAFT: ICD-10-CM

## 2017-02-22 DIAGNOSIS — D63.0 ANEMIA IN NEOPLASTIC DISEASE: ICD-10-CM

## 2017-02-22 DIAGNOSIS — I47.1 SUPRAVENTRICULAR TACHYCARDIA: ICD-10-CM

## 2017-02-22 DIAGNOSIS — Z91.14 PATIENT'S OTHER NONCOMPLIANCE WITH MEDICATION REGIMEN: ICD-10-CM

## 2017-02-22 DIAGNOSIS — I25.10 ATHEROSCLEROTIC HEART DISEASE OF NATIVE CORONARY ARTERY WITHOUT ANGINA PECTORIS: ICD-10-CM

## 2017-02-22 DIAGNOSIS — C25.9 MALIGNANT NEOPLASM OF PANCREAS, UNSPECIFIED: ICD-10-CM

## 2017-02-22 DIAGNOSIS — R00.2 PALPITATIONS: ICD-10-CM

## 2017-02-22 DIAGNOSIS — D50.8 OTHER IRON DEFICIENCY ANEMIAS: ICD-10-CM

## 2017-02-22 DIAGNOSIS — Z95.2 PRESENCE OF PROSTHETIC HEART VALVE: ICD-10-CM

## 2017-02-22 DIAGNOSIS — I24.8 OTHER FORMS OF ACUTE ISCHEMIC HEART DISEASE: ICD-10-CM

## 2017-02-22 DIAGNOSIS — I34.0 NONRHEUMATIC MITRAL (VALVE) INSUFFICIENCY: ICD-10-CM

## 2017-02-22 DIAGNOSIS — I50.21 ACUTE SYSTOLIC (CONGESTIVE) HEART FAILURE: ICD-10-CM

## 2017-02-22 DIAGNOSIS — Z79.82 LONG TERM (CURRENT) USE OF ASPIRIN: ICD-10-CM

## 2017-02-28 PROCEDURE — 83880 ASSAY OF NATRIURETIC PEPTIDE: CPT

## 2017-02-28 PROCEDURE — C1730: CPT

## 2017-02-28 PROCEDURE — C1769: CPT

## 2017-02-28 PROCEDURE — 85027 COMPLETE CBC AUTOMATED: CPT

## 2017-02-28 PROCEDURE — 36415 COLL VENOUS BLD VENIPUNCTURE: CPT

## 2017-02-28 PROCEDURE — 80061 LIPID PANEL: CPT

## 2017-02-28 PROCEDURE — 82550 ASSAY OF CK (CPK): CPT

## 2017-02-28 PROCEDURE — 96375 TX/PRO/DX INJ NEW DRUG ADDON: CPT

## 2017-02-28 PROCEDURE — 80053 COMPREHEN METABOLIC PANEL: CPT

## 2017-02-28 PROCEDURE — 97116 GAIT TRAINING THERAPY: CPT

## 2017-02-28 PROCEDURE — 86900 BLOOD TYPING SEROLOGIC ABO: CPT

## 2017-02-28 PROCEDURE — 71275 CT ANGIOGRAPHY CHEST: CPT

## 2017-02-28 PROCEDURE — G0378: CPT

## 2017-02-28 PROCEDURE — C1892: CPT

## 2017-02-28 PROCEDURE — P9016: CPT

## 2017-02-28 PROCEDURE — 80307 DRUG TEST PRSMV CHEM ANLYZR: CPT

## 2017-02-28 PROCEDURE — C1877: CPT

## 2017-02-28 PROCEDURE — 86923 COMPATIBILITY TEST ELECTRIC: CPT

## 2017-02-28 PROCEDURE — 96374 THER/PROPH/DIAG INJ IV PUSH: CPT

## 2017-02-28 PROCEDURE — 71046 X-RAY EXAM CHEST 2 VIEWS: CPT

## 2017-02-28 PROCEDURE — 81003 URINALYSIS AUTO W/O SCOPE: CPT

## 2017-02-28 PROCEDURE — 83550 IRON BINDING TEST: CPT

## 2017-02-28 PROCEDURE — 83690 ASSAY OF LIPASE: CPT

## 2017-02-28 PROCEDURE — C1887: CPT

## 2017-02-28 PROCEDURE — 80048 BASIC METABOLIC PNL TOTAL CA: CPT

## 2017-02-28 PROCEDURE — 93306 TTE W/DOPPLER COMPLETE: CPT

## 2017-02-28 PROCEDURE — 85379 FIBRIN DEGRADATION QUANT: CPT

## 2017-02-28 PROCEDURE — 36430 TRANSFUSION BLD/BLD COMPNT: CPT

## 2017-02-28 PROCEDURE — 84443 ASSAY THYROID STIM HORMONE: CPT

## 2017-02-28 PROCEDURE — 84436 ASSAY OF TOTAL THYROXINE: CPT

## 2017-02-28 PROCEDURE — 84484 ASSAY OF TROPONIN QUANT: CPT

## 2017-02-28 PROCEDURE — 99285 EMERGENCY DEPT VISIT HI MDM: CPT | Mod: 25

## 2017-02-28 PROCEDURE — C2621: CPT

## 2017-02-28 PROCEDURE — 93005 ELECTROCARDIOGRAM TRACING: CPT

## 2017-02-28 PROCEDURE — 82553 CREATINE MB FRACTION: CPT

## 2017-02-28 PROCEDURE — 85610 PROTHROMBIN TIME: CPT

## 2017-02-28 PROCEDURE — C1889: CPT

## 2017-02-28 PROCEDURE — 86850 RBC ANTIBODY SCREEN: CPT

## 2017-02-28 PROCEDURE — 84100 ASSAY OF PHOSPHORUS: CPT

## 2017-02-28 PROCEDURE — 97110 THERAPEUTIC EXERCISES: CPT

## 2017-02-28 PROCEDURE — 71045 X-RAY EXAM CHEST 1 VIEW: CPT

## 2017-02-28 PROCEDURE — 84481 FREE ASSAY (FT-3): CPT

## 2017-02-28 PROCEDURE — 83735 ASSAY OF MAGNESIUM: CPT

## 2017-02-28 PROCEDURE — 82728 ASSAY OF FERRITIN: CPT

## 2017-02-28 PROCEDURE — 97161 PT EVAL LOW COMPLEX 20 MIN: CPT

## 2017-02-28 PROCEDURE — 83036 HEMOGLOBIN GLYCOSYLATED A1C: CPT

## 2017-02-28 PROCEDURE — 86901 BLOOD TYPING SEROLOGIC RH(D): CPT

## 2017-02-28 PROCEDURE — 93970 EXTREMITY STUDY: CPT

## 2017-04-06 ENCOUNTER — APPOINTMENT (OUTPATIENT)
Dept: HEART AND VASCULAR | Facility: CLINIC | Age: 82
End: 2017-04-06

## 2018-02-19 NOTE — DISCHARGE NOTE ADULT - NS MD DC FALL RISK RISK
For information on Fall & Injury Prevention, visit [email or web address]
normal...

## 2018-11-04 NOTE — PHYSICAL THERAPY INITIAL EVALUATION ADULT - DID THE PATIENT HAVE SURGERY?
n/a Constitutional: (-) fever  Eyes/ENT: (-) blurry vision, (-) epistaxis  Cardiovascular:see HPI  Respiratory: see HPI  Gastrointestinal: (-) vomiting, (-) diarrhea  Musculoskeletal: (-) neck pain, (-) back pain, (-) joint pain  Integumentary: (-) rash, (-) edema  Neurological: (-) headache, (-) altered mental status  Psychiatric: (-) hallucinations  Allergic/Immunologic: (-) pruritus

## 2018-12-26 NOTE — ED PROVIDER NOTE - NEURO NEGATIVE STATEMENT, MLM
stated
no loss of consciousness, no gait abnormality, no headache, no sensory deficits, and no weakness.

## 2024-07-02 NOTE — ED PROVIDER NOTE - FAMILY HISTORY
Purpose: Pt attended Welcome Workshop #1, facilitated by Manjinder Beasley, under supervision of Michelle Otto PsyD.      Intervention: Facilitators discussed trauma recovery and active coping strategies.       Plan: Pts treatment team will be notified and scheduled for clinical intake and/or treatment planning.    Family history unknown

## 2025-01-15 NOTE — ED CDU PROVIDER NOTE - SHIFT CHANGE
Copied from CRM #[id number]. Topic: MW Messaging - MW Patient Request  >> John 15, 2025 11:57 AM Madhavi KIM wrote:  Bhupendra Neff called requesting to send a general message to clinician.   Verified issue is NOT regarding a symptom(s) requiring routine or emergent triage. Verified another message template type and CRM does not apply.    Selected 'Wrap Up CRM' and created new Telephone Encounter after clicking 'Convert to Clinical Call'. Selected appropriate Reason for Call.  Sent Pt message template and routed as routine priority per Clinician KB page to appropriate clinician pool. Readback full message.    Patient needs to arrange transportation please contact and advise of address and time for scheduled colonoscopy.   Yes...